# Patient Record
Sex: MALE | Race: WHITE | NOT HISPANIC OR LATINO | Employment: OTHER | URBAN - METROPOLITAN AREA
[De-identification: names, ages, dates, MRNs, and addresses within clinical notes are randomized per-mention and may not be internally consistent; named-entity substitution may affect disease eponyms.]

---

## 2017-07-18 ENCOUNTER — HISTORICAL (OUTPATIENT)
Dept: ADMINISTRATIVE | Facility: HOSPITAL | Age: 72
End: 2017-07-18

## 2018-04-09 ENCOUNTER — HISTORICAL (OUTPATIENT)
Dept: LAB | Facility: HOSPITAL | Age: 73
End: 2018-04-09

## 2018-04-09 LAB
CHOLEST SERPL-MCNC: 242 MG/DL (ref 0–200)
CHOLEST/HDLC SERPL: 4.6 {RATIO} (ref 0–5)
GLUCOSE P FAST SERPL-MCNC: 90 MG/DL (ref 70–115)
HDLC SERPL-MCNC: 53 MG/DL (ref 35–60)
LDLC SERPL CALC-MCNC: 138 MG/DL (ref 0–129)
PSA SERPL-MCNC: 0.64 NG/ML (ref 0–4)
TRIGL SERPL-MCNC: 255 MG/DL (ref 30–150)
VLDLC SERPL CALC-MCNC: 51 MG/DL

## 2019-04-10 ENCOUNTER — HISTORICAL (OUTPATIENT)
Dept: LAB | Facility: HOSPITAL | Age: 74
End: 2019-04-10

## 2019-04-10 LAB
ABS NEUT (OLG): 5.86 X10(3)/MCL (ref 2.1–9.2)
ALBUMIN SERPL-MCNC: 4.1 GM/DL (ref 3.4–5)
ALBUMIN/GLOB SERPL: 1.3 RATIO (ref 1.1–2)
ALP SERPL-CCNC: 54 UNIT/L (ref 50–136)
ALT SERPL-CCNC: 33 UNIT/L (ref 12–78)
APPEARANCE, UA: ABNORMAL
AST SERPL-CCNC: 25 UNIT/L (ref 15–37)
BACTERIA SPEC CULT: ABNORMAL /HPF
BASOPHILS # BLD AUTO: 0.1 X10(3)/MCL (ref 0–0.2)
BASOPHILS NFR BLD AUTO: 1 %
BILIRUB SERPL-MCNC: 0.8 MG/DL (ref 0.2–1)
BILIRUB UR QL STRIP: NEGATIVE
BILIRUBIN DIRECT+TOT PNL SERPL-MCNC: 0.2 MG/DL (ref 0–0.5)
BILIRUBIN DIRECT+TOT PNL SERPL-MCNC: 0.6 MG/DL (ref 0–0.8)
BUN SERPL-MCNC: 16 MG/DL (ref 7–18)
CALCIUM SERPL-MCNC: 9.3 MG/DL (ref 8.5–10.1)
CHLORIDE SERPL-SCNC: 110 MMOL/L (ref 98–107)
CHOLEST SERPL-MCNC: 204 MG/DL (ref 0–200)
CHOLEST/HDLC SERPL: 3.3 {RATIO} (ref 0–5)
CO2 SERPL-SCNC: 25 MMOL/L (ref 21–32)
COLOR UR: YELLOW
CREAT SERPL-MCNC: 1.12 MG/DL (ref 0.7–1.3)
EOSINOPHIL # BLD AUTO: 0.2 X10(3)/MCL (ref 0–0.9)
EOSINOPHIL NFR BLD AUTO: 2 %
ERYTHROCYTE [DISTWIDTH] IN BLOOD BY AUTOMATED COUNT: 13.4 % (ref 11.5–17)
EST. AVERAGE GLUCOSE BLD GHB EST-MCNC: 105 MG/DL
GLOBULIN SER-MCNC: 3.2 GM/DL (ref 2.4–3.5)
GLUCOSE (UA): NEGATIVE
GLUCOSE SERPL-MCNC: 91 MG/DL (ref 74–106)
HBA1C MFR BLD: 5.3 % (ref 4.2–6.3)
HCT VFR BLD AUTO: 49.4 % (ref 42–52)
HDLC SERPL-MCNC: 62 MG/DL (ref 35–60)
HGB BLD-MCNC: 16.8 GM/DL (ref 14–18)
HGB UR QL STRIP: NEGATIVE
KETONES UR QL STRIP: ABNORMAL
LDLC SERPL CALC-MCNC: 122 MG/DL (ref 0–129)
LEUKOCYTE ESTERASE UR QL STRIP: ABNORMAL
LYMPHOCYTES # BLD AUTO: 2 X10(3)/MCL (ref 0.6–4.6)
LYMPHOCYTES NFR BLD AUTO: 23 %
MCH RBC QN AUTO: 32.1 PG (ref 27–31)
MCHC RBC AUTO-ENTMCNC: 34 GM/DL (ref 33–36)
MCV RBC AUTO: 94.5 FL (ref 80–94)
MONOCYTES # BLD AUTO: 0.7 X10(3)/MCL (ref 0.1–1.3)
MONOCYTES NFR BLD AUTO: 8 %
NEUTROPHILS # BLD AUTO: 5.86 X10(3)/MCL (ref 2.1–9.2)
NEUTROPHILS NFR BLD AUTO: 67 %
NITRITE UR QL STRIP: NEGATIVE
PH UR STRIP: 5 [PH] (ref 5–9)
PLATELET # BLD AUTO: 219 X10(3)/MCL (ref 130–400)
PMV BLD AUTO: 11.7 FL (ref 9.4–12.4)
POTASSIUM SERPL-SCNC: 4 MMOL/L (ref 3.5–5.1)
PROT SERPL-MCNC: 7.3 GM/DL (ref 6.4–8.2)
PROT UR QL STRIP: NEGATIVE
PSA SERPL-MCNC: 0.52 NG/ML (ref 0–4)
RBC # BLD AUTO: 5.23 X10(6)/MCL (ref 4.7–6.1)
RBC #/AREA URNS HPF: ABNORMAL /[HPF]
SODIUM SERPL-SCNC: 142 MMOL/L (ref 136–145)
SP GR UR STRIP: 1.02 (ref 1–1.03)
SQUAMOUS EPITHELIAL, UA: ABNORMAL
TRIGL SERPL-MCNC: 100 MG/DL (ref 30–150)
UROBILINOGEN UR STRIP-ACNC: 0.2
VLDLC SERPL CALC-MCNC: 20 MG/DL
WBC # SPEC AUTO: 8.8 X10(3)/MCL (ref 4.5–11.5)
WBC #/AREA URNS HPF: 6 /HPF (ref 0–3)

## 2021-12-01 LAB
BUN SERPL-MCNC: 16 MG/DL (ref 7–18)
CREAT SERPL-MCNC: 1.03 MG/DL (ref 0.6–1.3)
GLUCOSE SERPL-MCNC: 151 MG/DL (ref 74–106)
POTASSIUM SERPL-SCNC: 4.2 MMOL/L (ref 3.5–5.1)
SODIUM SERPL-SCNC: 137 MEQ/L (ref 131–145)

## 2022-04-10 ENCOUNTER — HISTORICAL (OUTPATIENT)
Dept: ADMINISTRATIVE | Facility: HOSPITAL | Age: 77
End: 2022-04-10

## 2022-04-25 VITALS
HEIGHT: 67 IN | SYSTOLIC BLOOD PRESSURE: 132 MMHG | WEIGHT: 190.94 LBS | DIASTOLIC BLOOD PRESSURE: 78 MMHG | BODY MASS INDEX: 29.97 KG/M2 | OXYGEN SATURATION: 97 %

## 2022-09-19 ENCOUNTER — HISTORICAL (OUTPATIENT)
Dept: ADMINISTRATIVE | Facility: HOSPITAL | Age: 77
End: 2022-09-19

## 2023-01-20 ENCOUNTER — TELEPHONE (OUTPATIENT)
Dept: PRIMARY CARE CLINIC | Facility: CLINIC | Age: 78
End: 2023-01-20
Payer: COMMERCIAL

## 2023-01-20 NOTE — TELEPHONE ENCOUNTER
----- Message from Gladis Mckeon sent at 1/20/2023  8:28 AM CST -----  Regarding: med adv  Type:  Needs Medical Advice    Who Called: Chantel, patient's wife  Symptoms (please be specific): BP elevated  140/66  How long has patient had these symptoms:  past weeks  Pharmacy name and phone #:  [facility]  Would the patient rather a call back or a response via [facility]?   Best Call Back Number: [phone]  Additional Information: patient's BP has been elevated. Please call patient's wife back.

## 2023-01-22 PROBLEM — E78.2 MIXED HYPERLIPIDEMIA: Status: RESOLVED | Noted: 2023-01-22 | Resolved: 2023-01-22

## 2023-01-22 PROBLEM — E55.9 VITAMIN D DEFICIENCY: Status: ACTIVE | Noted: 2023-01-22

## 2023-01-22 PROBLEM — F17.210 NICOTINE DEPENDENCE, CIGARETTES, UNCOMPLICATED: Chronic | Status: ACTIVE | Noted: 2023-01-22

## 2023-01-22 PROBLEM — F17.210 NICOTINE DEPENDENCE, CIGARETTES, UNCOMPLICATED: Status: ACTIVE | Noted: 2023-01-22

## 2023-01-22 PROBLEM — Z90.49 S/P COLON RESECTION: Status: ACTIVE | Noted: 2023-01-22

## 2023-01-22 PROBLEM — E55.9 VITAMIN D DEFICIENCY: Chronic | Status: ACTIVE | Noted: 2023-01-22

## 2023-01-22 PROBLEM — E66.9 OBESITY: Status: ACTIVE | Noted: 2023-01-22

## 2023-01-22 PROBLEM — Z72.0 TOBACCO USER: Status: ACTIVE | Noted: 2023-01-22

## 2023-01-22 PROBLEM — E78.2 MIXED HYPERLIPIDEMIA: Status: ACTIVE | Noted: 2023-01-22

## 2023-01-22 PROBLEM — Z93.2 ILEOSTOMY PRESENT: Status: ACTIVE | Noted: 2023-01-22

## 2023-01-22 PROBLEM — E66.9 OBESITY: Chronic | Status: ACTIVE | Noted: 2023-01-22

## 2023-01-22 PROBLEM — R53.83 FATIGUE: Status: ACTIVE | Noted: 2023-01-22

## 2023-01-22 PROBLEM — Z72.0 TOBACCO USER: Status: RESOLVED | Noted: 2023-01-22 | Resolved: 2023-01-22

## 2023-01-22 PROBLEM — Z90.49 S/P COLON RESECTION: Chronic | Status: ACTIVE | Noted: 2023-01-22

## 2023-01-22 PROBLEM — Z93.2 ILEOSTOMY PRESENT: Chronic | Status: ACTIVE | Noted: 2023-01-22

## 2023-01-22 PROBLEM — E78.5 DYSLIPIDEMIA: Chronic | Status: ACTIVE | Noted: 2023-01-22

## 2023-01-22 NOTE — PROGRESS NOTES
"Subjective:       Patient ID: Javier De Santiago is a 77 y.o. male.    Chief Complaint: Hypertension    Established patient, last visit 11/22/2021.  Presents to the clinic to discuss elevation in blood pressure, no prior history of HTN.  Blood pressure was elevated at the dentist office, he started checking it at home, blood pressures look good, systolics occasionally slightly above 140, diastolics never above 80.  No other symptoms or problems other than his sinuses.  Does suffer with chronic and recurrent sinus problems, currently having maxillary pressure, thick mucus, fatigue.      Review of Systems   Constitutional: Negative.  Negative for fatigue and fever.   HENT:  Negative for sore throat and trouble swallowing.         Maxillary pressure/congestion   Eyes: Negative.  Negative for redness and visual disturbance.   Respiratory: Negative.  Negative for chest tightness and shortness of breath.    Cardiovascular: Negative.  Negative for chest pain.   Gastrointestinal: Negative.  Negative for abdominal pain, blood in stool and nausea.   Endocrine: Negative.  Negative for cold intolerance, heat intolerance and polyuria.   Genitourinary: Negative.    Musculoskeletal: Negative.  Negative for arthralgias, gait problem and joint swelling.   Integumentary:  Negative for rash. Negative.   Neurological: Negative.  Negative for dizziness, weakness and headaches.   Psychiatric/Behavioral: Negative.  Negative for agitation and dysphoric mood.        Objective:     Vitals:    01/23/23 1312   BP: 120/78   Pulse: 64   Resp: 18   SpO2: 97%   Weight: 84.4 kg (186 lb)   Height: 5' 6" (1.676 m)   Body mass index is 30.02 kg/m².     Physical Exam  Constitutional:       Appearance: Normal appearance.   Eyes:      Conjunctiva/sclera: Conjunctivae normal.   Cardiovascular:      Rate and Rhythm: Normal rate and regular rhythm.   Pulmonary:      Effort: Pulmonary effort is normal.      Breath sounds: Normal breath sounds.   Skin:     " General: Skin is warm and dry.   Neurological:      Mental Status: He is alert.   Psychiatric:         Mood and Affect: Mood normal.         Behavior: Behavior normal.         Lab Results   Component Value Date     12/01/2021    K 4.2 12/01/2021    CO2 26.0 01/08/2020    BUN 16 12/01/2021    CREATININE 1.03 12/01/2021    CALCIUM 9.6 01/08/2020    ALBUMIN 3.20 (L) 01/08/2020    BILITOT 1.1 (H) 01/08/2020    ALKPHOS 72 01/08/2020    AST 15 01/08/2020    ALT 22 01/08/2020    EGFRNONAA 43 01/08/2020     Lab Results   Component Value Date    WBC 12.5 (H) 01/08/2020    RBC 4.84 01/08/2020    HGB 15.5 01/08/2020    HCT 45.0 01/08/2020    MCV 93.0 01/08/2020    RDW 12.5 01/08/2020     01/08/2020      Lab Results   Component Value Date    CHOL 204 (H) 04/10/2019    TRIG 100 04/10/2019    HDL 62 (H) 04/10/2019     04/10/2019    TOTALCHOLEST 3.3 04/10/2019     No results found for: TSH  Lab Results   Component Value Date    HGBA1C 5.3 04/10/2019        Lab Results   Component Value Date    PSA 0.52 04/10/2019         Assessment:     Problem List Items Addressed This Visit          ENT    Acute recurrent maxillary sinusitis - Primary (Chronic)    Current Assessment & Plan     Increased fluid intake.  Start antibiotic today, take until completion.  Ibuprofen or Tylenol for sinus pain/fever as needed.  Delsym with guaifenesin or Mucinex DM as needed for cough and congestion.  Seek further medical care for any further significant problems.         Relevant Medications    betamethasone acetate-betamethasone sodium phosphate injection 12 mg (Start on 1/23/2023  1:45 PM)    cefdinir (OMNICEF) 300 MG capsule       Cardiac/Vascular    Dyslipidemia (Chronic)    Relevant Orders    Lipid Panel     Other Visit Diagnoses       Wellness examination        This diagnosis does not apply to this visit, wellness exam with pre visit labs will be scheduled/ordered for future visit.    Relevant Orders    TSH    Lipid Panel     Comprehensive Metabolic Panel    Hemoglobin A1C    CBC Auto Differential    PSA, Screening    Hepatitis C Antibody    Screening for prostate cancer        This diagnosis does not apply to this visit, appropriate prostate cancer screening will be ordered for next visit/wellness exam.    Relevant Orders    PSA, Screening                 Plan:             Follow up in about 14 weeks (around 5/1/2023) for Medicare Wellness.

## 2023-01-23 ENCOUNTER — OFFICE VISIT (OUTPATIENT)
Dept: PRIMARY CARE CLINIC | Facility: CLINIC | Age: 78
End: 2023-01-23
Payer: COMMERCIAL

## 2023-01-23 VITALS
WEIGHT: 186 LBS | RESPIRATION RATE: 18 BRPM | HEART RATE: 64 BPM | SYSTOLIC BLOOD PRESSURE: 120 MMHG | DIASTOLIC BLOOD PRESSURE: 78 MMHG | HEIGHT: 66 IN | BODY MASS INDEX: 29.89 KG/M2 | OXYGEN SATURATION: 97 %

## 2023-01-23 DIAGNOSIS — J01.01 ACUTE RECURRENT MAXILLARY SINUSITIS: Primary | Chronic | ICD-10-CM

## 2023-01-23 DIAGNOSIS — Z00.00 WELLNESS EXAMINATION: ICD-10-CM

## 2023-01-23 DIAGNOSIS — Z12.5 SCREENING FOR PROSTATE CANCER: ICD-10-CM

## 2023-01-23 DIAGNOSIS — E78.5 DYSLIPIDEMIA: Chronic | ICD-10-CM

## 2023-01-23 PROCEDURE — 3078F DIAST BP <80 MM HG: CPT | Mod: CPTII,,, | Performed by: GENERAL PRACTICE

## 2023-01-23 PROCEDURE — 3074F SYST BP LT 130 MM HG: CPT | Mod: CPTII,,, | Performed by: GENERAL PRACTICE

## 2023-01-23 PROCEDURE — 1159F MED LIST DOCD IN RCRD: CPT | Mod: CPTII,,, | Performed by: GENERAL PRACTICE

## 2023-01-23 PROCEDURE — 99213 PR OFFICE/OUTPT VISIT, EST, LEVL III, 20-29 MIN: ICD-10-PCS | Mod: 25,,, | Performed by: GENERAL PRACTICE

## 2023-01-23 PROCEDURE — 1159F PR MEDICATION LIST DOCUMENTED IN MEDICAL RECORD: ICD-10-PCS | Mod: CPTII,,, | Performed by: GENERAL PRACTICE

## 2023-01-23 PROCEDURE — 3074F PR MOST RECENT SYSTOLIC BLOOD PRESSURE < 130 MM HG: ICD-10-PCS | Mod: CPTII,,, | Performed by: GENERAL PRACTICE

## 2023-01-23 PROCEDURE — 3078F PR MOST RECENT DIASTOLIC BLOOD PRESSURE < 80 MM HG: ICD-10-PCS | Mod: CPTII,,, | Performed by: GENERAL PRACTICE

## 2023-01-23 PROCEDURE — 99213 OFFICE O/P EST LOW 20 MIN: CPT | Mod: 25,,, | Performed by: GENERAL PRACTICE

## 2023-01-23 PROCEDURE — 96372 PR INJECTION,THERAP/PROPH/DIAG2ST, IM OR SUBCUT: ICD-10-PCS | Mod: ,,, | Performed by: GENERAL PRACTICE

## 2023-01-23 PROCEDURE — 96372 THER/PROPH/DIAG INJ SC/IM: CPT | Mod: ,,, | Performed by: GENERAL PRACTICE

## 2023-01-23 RX ORDER — FLUTICASONE PROPIONATE 50 MCG
2 SPRAY, SUSPENSION (ML) NASAL DAILY
COMMUNITY

## 2023-01-23 RX ORDER — POTASSIUM CITRATE 10 MEQ/1
10 TABLET, EXTENDED RELEASE ORAL 3 TIMES DAILY
COMMUNITY
Start: 2022-12-27

## 2023-01-23 RX ORDER — TAMSULOSIN HYDROCHLORIDE 0.4 MG/1
0.4 CAPSULE ORAL
COMMUNITY
End: 2023-05-03

## 2023-01-23 RX ORDER — MINERAL OIL
180 ENEMA (ML) RECTAL DAILY
COMMUNITY

## 2023-01-23 RX ORDER — CEFDINIR 300 MG/1
300 CAPSULE ORAL 2 TIMES DAILY
Qty: 20 CAPSULE | Refills: 0 | Status: SHIPPED | OUTPATIENT
Start: 2023-01-23 | End: 2023-02-02

## 2023-01-23 RX ORDER — BETAMETHASONE SODIUM PHOSPHATE AND BETAMETHASONE ACETATE 3; 3 MG/ML; MG/ML
12 INJECTION, SUSPENSION INTRA-ARTICULAR; INTRALESIONAL; INTRAMUSCULAR; SOFT TISSUE
Status: COMPLETED | OUTPATIENT
Start: 2023-01-23 | End: 2023-01-23

## 2023-01-23 RX ADMIN — BETAMETHASONE SODIUM PHOSPHATE AND BETAMETHASONE ACETATE 12 MG: 3; 3 INJECTION, SUSPENSION INTRA-ARTICULAR; INTRALESIONAL; INTRAMUSCULAR; SOFT TISSUE at 01:01

## 2023-04-12 ENCOUNTER — TELEPHONE (OUTPATIENT)
Dept: PRIMARY CARE CLINIC | Facility: CLINIC | Age: 78
End: 2023-04-12
Payer: COMMERCIAL

## 2023-04-12 NOTE — TELEPHONE ENCOUNTER
Spoke with Patient's wife regarding Wellness visit and Nurse visit. She wanted to make sure if we were in network with pt's insurance and if we weren't could he draw blood elsewhere. She will contact me if I need to print lab orders for them to take to an in network facility

## 2023-04-12 NOTE — TELEPHONE ENCOUNTER
----- Message from Emilie Hansen sent at 4/12/2023 11:44 AM CDT -----  Regarding: appt - charge  .Type:  Needs Medical Advice    Who Called: pt - wife   Symptoms (please be specific):    How long has patient had these symptoms:    Pharmacy name and phone #:    Would the patient rather a call back or a response via [facility]?   Best Call Back Number: [phone]  Additional Information: has questions about up coming appt . call to advise

## 2023-04-12 NOTE — TELEPHONE ENCOUNTER
----- Message from Emilie Hansen sent at 4/12/2023 11:44 AM CDT -----  Regarding: appt - charge  .Type:  Needs Medical Advice    Who Called: pt - wife   Symptoms (please be specific):    How long has patient had these symptoms:    Pharmacy name and phone #:    Would the patient rather a call back or a response via [facility]?   Best Call Back Number: [phone]  Additional Information: has questions about up coming appt . call to advise

## 2023-04-27 ENCOUNTER — CLINICAL SUPPORT (OUTPATIENT)
Dept: PRIMARY CARE CLINIC | Facility: CLINIC | Age: 78
End: 2023-04-27
Payer: COMMERCIAL

## 2023-04-27 DIAGNOSIS — E78.5 DYSLIPIDEMIA: Chronic | ICD-10-CM

## 2023-04-27 DIAGNOSIS — Z12.5 SCREENING FOR PROSTATE CANCER: ICD-10-CM

## 2023-04-27 DIAGNOSIS — Z00.00 WELLNESS EXAMINATION: ICD-10-CM

## 2023-04-27 DIAGNOSIS — Z00.00 ENCOUNTER FOR WELLNESS EXAMINATION IN ADULT: Primary | ICD-10-CM

## 2023-04-27 LAB
ALBUMIN SERPL-MCNC: 3.7 G/DL (ref 3.4–4.8)
ALBUMIN/GLOB SERPL: 1.1 RATIO (ref 1.1–2)
ALP SERPL-CCNC: 59 UNIT/L (ref 40–150)
ALT SERPL-CCNC: 16 UNIT/L (ref 0–55)
AST SERPL-CCNC: 24 UNIT/L (ref 5–34)
BASOPHILS # BLD AUTO: 0.04 X10(3)/MCL (ref 0–0.2)
BASOPHILS NFR BLD AUTO: 0.5 %
BILIRUBIN DIRECT+TOT PNL SERPL-MCNC: 0.9 MG/DL
BUN SERPL-MCNC: 18.5 MG/DL (ref 8.4–25.7)
CALCIUM SERPL-MCNC: 9.7 MG/DL (ref 8.8–10)
CHLORIDE SERPL-SCNC: 107 MMOL/L (ref 98–107)
CHOLEST SERPL-MCNC: 210 MG/DL
CHOLEST/HDLC SERPL: 4 {RATIO} (ref 0–5)
CO2 SERPL-SCNC: 23 MMOL/L (ref 23–31)
CREAT SERPL-MCNC: 0.99 MG/DL (ref 0.73–1.18)
EOSINOPHIL # BLD AUTO: 0.29 X10(3)/MCL (ref 0–0.9)
EOSINOPHIL NFR BLD AUTO: 3.4 %
ERYTHROCYTE [DISTWIDTH] IN BLOOD BY AUTOMATED COUNT: 13.6 % (ref 11.5–17)
EST. AVERAGE GLUCOSE BLD GHB EST-MCNC: 108.3 MG/DL
GFR SERPLBLD CREATININE-BSD FMLA CKD-EPI: >60 MLS/MIN/1.73/M2
GLOBULIN SER-MCNC: 3.4 GM/DL (ref 2.4–3.5)
GLUCOSE SERPL-MCNC: 99 MG/DL (ref 82–115)
HBA1C MFR BLD: 5.4 %
HCT VFR BLD AUTO: 47.2 % (ref 42–52)
HDLC SERPL-MCNC: 53 MG/DL (ref 35–60)
HGB BLD-MCNC: 16.1 G/DL (ref 14–18)
IMM GRANULOCYTES # BLD AUTO: 0.03 X10(3)/MCL (ref 0–0.04)
IMM GRANULOCYTES NFR BLD AUTO: 0.3 %
LDLC SERPL CALC-MCNC: 137 MG/DL (ref 50–140)
LYMPHOCYTES # BLD AUTO: 3.74 X10(3)/MCL (ref 0.6–4.6)
LYMPHOCYTES NFR BLD AUTO: 43.5 %
MCH RBC QN AUTO: 33 PG (ref 27–31)
MCHC RBC AUTO-ENTMCNC: 34.1 G/DL (ref 33–36)
MCV RBC AUTO: 96.7 FL (ref 80–94)
MONOCYTES # BLD AUTO: 0.75 X10(3)/MCL (ref 0.1–1.3)
MONOCYTES NFR BLD AUTO: 8.7 %
NEUTROPHILS # BLD AUTO: 3.74 X10(3)/MCL (ref 2.1–9.2)
NEUTROPHILS NFR BLD AUTO: 43.6 %
NRBC BLD AUTO-RTO: 0 %
PLATELET # BLD AUTO: 205 X10(3)/MCL (ref 130–400)
PMV BLD AUTO: 11.3 FL (ref 7.4–10.4)
POTASSIUM SERPL-SCNC: 4.4 MMOL/L (ref 3.5–5.1)
PROT SERPL-MCNC: 7.1 GM/DL (ref 5.8–7.6)
PSA SERPL-MCNC: 0.79 NG/ML
RBC # BLD AUTO: 4.88 X10(6)/MCL (ref 4.7–6.1)
SODIUM SERPL-SCNC: 138 MMOL/L (ref 136–145)
TRIGL SERPL-MCNC: 101 MG/DL (ref 34–140)
TSH SERPL-ACNC: 2.64 UIU/ML (ref 0.35–4.94)
VLDLC SERPL CALC-MCNC: 20 MG/DL
WBC # SPEC AUTO: 8.6 X10(3)/MCL (ref 4.5–11.5)

## 2023-04-27 PROCEDURE — 84443 ASSAY THYROID STIM HORMONE: CPT | Performed by: GENERAL PRACTICE

## 2023-04-27 PROCEDURE — 85025 COMPLETE CBC W/AUTO DIFF WBC: CPT | Performed by: GENERAL PRACTICE

## 2023-04-27 PROCEDURE — 80053 COMPREHEN METABOLIC PANEL: CPT | Performed by: GENERAL PRACTICE

## 2023-04-27 PROCEDURE — 36415 PR COLLECTION VENOUS BLOOD,VENIPUNCTURE: ICD-10-PCS | Mod: ,,, | Performed by: GENERAL PRACTICE

## 2023-04-27 PROCEDURE — 83036 HEMOGLOBIN GLYCOSYLATED A1C: CPT | Performed by: GENERAL PRACTICE

## 2023-04-27 PROCEDURE — 86803 HEPATITIS C AB TEST: CPT | Performed by: GENERAL PRACTICE

## 2023-04-27 PROCEDURE — 36415 COLL VENOUS BLD VENIPUNCTURE: CPT | Mod: ,,, | Performed by: GENERAL PRACTICE

## 2023-04-27 PROCEDURE — 36415 COLL VENOUS BLD VENIPUNCTURE: CPT

## 2023-04-27 PROCEDURE — 84153 ASSAY OF PSA TOTAL: CPT | Performed by: GENERAL PRACTICE

## 2023-04-27 PROCEDURE — 80061 LIPID PANEL: CPT | Performed by: GENERAL PRACTICE

## 2023-04-28 LAB — HCV AB SERPL QL IA: NONREACTIVE

## 2023-05-02 NOTE — PROGRESS NOTES
Patient ID: [id number]     Chief Complaint: Annual Exam and Sinus Problem      HPI:     Javier DeS antiago is a 77 y.o. male here today for a Medicare Wellness.     A significant and separate E/M service was performed.  Patient has a multitude of complaints today, of which the most pressing is a recurrence of his sinus issues.  I saw him several months ago for a recurrent sinus infection.  I gave him a Celestone injection, and Omnicef 300 mg twice a day for 10 days.  He states that that treatment plan helped immensely, in fact after the 10 days he was much better than he had been in a while.  However, has had a slow recurrence of his symptoms, now with sinus pressure, thickening slightly purulent mucus, and some postnasal drainage.  No fever.  Has never had an evaluation of his sinus function or allergy status.  Also, has some issues with ongoing fatigue, some issues with stability during walking.  Nothing that would cause any significant amount of dysfunction, nothing that he would wish to have worked up medically.      ----------------------------  Fatigue  H/O colectomy  H/O ileostomy  Hyperlipidemia     Past Surgical History:   Procedure Laterality Date    COLECTOMY      CYSTO, RETROGRADE PYELOGRAM,BALLOON DILATION, STENT PLACEMENT  01/24/2020    ILEOSTOMY         Review of patient's allergies indicates:  No Known Allergies    Outpatient Medications Marked as Taking for the 5/3/23 encounter (Office Visit) with Mick Lorenzana MD   Medication Sig Dispense Refill    fluticasone propionate (FLONASE) 50 mcg/actuation nasal spray 2 sprays by Each Nostril route once daily.      potassium citrate (UROCIT-K) 10 mEq (1,080 mg) TbSR Take 10 mEq by mouth 3 (three) times daily.       Current Facility-Administered Medications for the 5/3/23 encounter (Office Visit) with Mick Lorenzana MD   Medication Dose Route Frequency Provider Last Rate Last Admin    [COMPLETED] triamcinolone acetonide injection 40 mg  40 mg Intramuscular 1  time in Clinic/HOD Mick Lorenzana MD   40 mg at 05/03/23 1117    [DISCONTINUED] triamcinolone acetonide injection 40 mg  40 mg Intra-articular 1 time in Clinic/HOD Mick Lorenzana MD           Social History     Socioeconomic History    Marital status:    Tobacco Use    Smoking status: Every Day     Types: Pipe    Smokeless tobacco: Never    Tobacco comments:     A pipe a day keeps the ...        Family History   Problem Relation Age of Onset    Dementia Mother     Heart failure Father         Patient Care Team:  Mick Lorenzana MD as PCP - General (Family Medicine)  Jatin Regan MD as Consulting Physician (Urology)     Opioid Screening: Patient medication list reviewed, patient is not taking prescription opioids. Patient is not using additional opioids than prescribed. Patient is at low risk of substance abuse based on this opioid use history.       Subjective:     Review of Systems   Constitutional:  Positive for malaise/fatigue. Negative for weight loss.   HENT:  Positive for congestion and sinus pain.    Eyes: Negative.    Respiratory: Negative.  Negative for shortness of breath.    Cardiovascular: Negative.  Negative for chest pain.   Gastrointestinal: Negative.    Genitourinary: Negative.    Musculoskeletal: Negative.    Skin: Negative.    Neurological: Negative.  Negative for focal weakness, weakness and headaches.   Endo/Heme/Allergies: Negative.    Psychiatric/Behavioral: Negative.  Negative for depression and memory loss. The patient is not nervous/anxious.        Patient Reported Health Risk Assessment  What [age] 70-79  Are you male or female?: Male  During the past four weeks, how much have you been bothered by emotional problems such as feeling anxious, depressed, irritable, sad, or downhearted and blue?: Not at all  During the past five weeks, has your physical and/or emotional health limited your social activities with family, friends, neighbors, or groups?: Not at all  During the  past four weeks, how much bodily pain have you generally had?: No pain  During the past four weeks, was someone available to help if you needed and wanted help?: Yes, as much as I wanted  During the past four weeks, what was the hardest physical activity you could do for at least two minutes?: Light  Can you get to places out of walking distance without help?  (For example, can you travel alone on buses or taxis, or drive your own car?): Yes  Can you go shopping for groceries or clothes without someone's help?: Yes  Can you prepare your own meals?: Yes  Can you do your own housework without help?: Yes  Because of any health problems, do you need the help of another person with your personal care needs such as eating, bathing, dressing, or getting around the house?: No  Can you handle your own money without help?: Yes  During the past four weeks, how would you rate your health in general?: Good  How have things been going for you during the past four weeks?: Pretty well  Are you having difficulties driving your car?: No  Do you always fasten your seat belt when you are in a car?: Yes, usually  How often in the past four weeks have you been bothered by falling or dizzy when standing up?: Never  How often in the past four weeks have you been bothered by sexual problems?: Never  How often in the past four weeks have you been bothered by trouble eating well?: Never  How often in the past four weeks have you been bothered by teeth or denture problems?: Never  How often in the past four weeks have you been bothered with problems using the telephone?: Never  How often in the past four weeks have you been bothered by tiredness or fatigue?: Never  Have you fallen two or more times in the past year?: No  Are you afraid of falling?: No  Are you a smoker?: Yes, I'm not ready to quit  During the past four weeks, how many drinks of wine, beer, or other alcoholic beverages did you have?: No alcohol at all  Do you exercise for about  "20 minutes three or more days a week?: No, I usually do not exercise this much  Have you been given any information to help you with hazards in your house that might hurt you?: No  Have you been given any information to help you with keeping track of your medications?: No  How often do you have trouble taking medicines the way you've been told to take them?: I always take them as prescribed  How confident are you that you can control and manage most of your health problems?: Very confident  What is your race? (Check all that apply.):     Objective:     /80   Pulse 60   Resp 18   Ht 5' 6" (1.676 m)   Wt 85.7 kg (189 lb)   SpO2 100%   BMI 30.51 kg/m²     Physical Exam  Constitutional:       Appearance: Normal appearance.   HENT:      Head: Normocephalic.      Comments: Sinus pressure, congestion     Mouth/Throat:      Pharynx: Oropharynx is clear.   Eyes:      Conjunctiva/sclera: Conjunctivae normal.      Pupils: Pupils are equal, round, and reactive to light.   Cardiovascular:      Rate and Rhythm: Normal rate and regular rhythm.      Heart sounds: Normal heart sounds.   Pulmonary:      Effort: Pulmonary effort is normal.      Breath sounds: Normal breath sounds.   Abdominal:      General: Abdomen is flat.      Palpations: Abdomen is soft.   Musculoskeletal:         General: Normal range of motion.      Cervical back: Neck supple.   Skin:     General: Skin is warm and dry.   Neurological:      General: No focal deficit present.      Mental Status: He is oriented to person, place, and time.   Psychiatric:         Mood and Affect: Mood normal.         Behavior: Behavior normal.         Thought Content: Thought content normal.         Judgment: Judgment normal.       Lab Results   Component Value Date     04/27/2023    K 4.4 04/27/2023    CO2 23 04/27/2023    BUN 18.5 04/27/2023    CREATININE 0.99 04/27/2023    CALCIUM 9.7 04/27/2023    ALBUMIN 3.7 04/27/2023    BILITOT 0.9 04/27/2023    " ALKPHOS 59 04/27/2023    AST 24 04/27/2023    ALT 16 04/27/2023    EGFRNORACEVR >60 04/27/2023     Lab Results   Component Value Date    WBC 8.6 04/27/2023    RBC 4.88 04/27/2023    HGB 16.1 04/27/2023    HCT 47.2 04/27/2023    MCV 96.7 (H) 04/27/2023    RDW 13.6 04/27/2023     04/27/2023      Lab Results   Component Value Date    CHOL 210 (H) 04/27/2023    TRIG 101 04/27/2023    HDL 53 04/27/2023    .00 04/27/2023    TOTALCHOLEST 4 04/27/2023     Lab Results   Component Value Date    TSH 2.639 04/27/2023     Lab Results   Component Value Date    HGBA1C 5.4 04/27/2023        Lab Results   Component Value Date    PSA 0.79 04/27/2023         No flowsheet data found.  Fall Risk Assessment - Outpatient 5/3/2023   Mobility Status Ambulatory   Number of falls 0   Identified as fall risk 0           Depression Screening  Over the past two weeks, has the patient felt down, depressed, or hopeless?: No  Over the past two weeks, has the patient felt little interest or pleasure in doing things?: No  Functional Ability/Safety Screening  Was the patient's timed Up & Go test unsteady or longer than 30 seconds?: No  Does the patient need help with phone, transportation, shopping, preparing meals, housework, laundry, meds, or managing money?: No  Does the patient's home have rugs in the hallway, lack grab bars in the bathroom, lack handrails on the stairs or have poor lighting?: No  Have you noticed any hearing difficulties?: No  Cognitive Function (Assessed through direct observation with due consideration of information obtained by way of patient reports and/or concerns raised by family, friends, caretakers, or others)    Does the patient repeat questions/statements in the same day?: No  Does the patient have trouble remembering the date, year, and time?: No  Does the patient have difficulty managing finances?: No  Does the patient have a decreased sense of direction?: No  Assessment:       ICD-10-CM ICD-9-CM   1.  Medicare annual wellness visit, subsequent  Z00.00 V70.0   2. Screening for prostate cancer  Z12.5 V76.44   3. Dyslipidemia  E78.5 272.4   4. Class 1 obesity due to excess calories with serious comorbidity and body mass index (BMI) of 30.0 to 30.9 in adult  E66.09 278.00    Z68.30 V85.30   5. Nicotine dependence, cigarettes, uncomplicated  F17.210 305.1   6. Acute recurrent maxillary sinusitis  J01.01 461.0        Plan:     Medicare Annual Wellness and Personalized Prevention Plan:   Fall Risk + Home Safety + Hearing Impairment + Depression Screen + Cognitive Impairment Screen + Health Risk Assessment all reviewed.       Health Maintenance Topics with due status: Not Due       Topic Last Completion Date    Lipid Panel 04/27/2023      The patient's Health Maintenance was reviewed and the following appears to be due at this time:   Health Maintenance Due   Topic Date Due    Abdominal Aortic Aneurysm Screening  Never done         1. Medicare annual wellness visit, subsequent  Comments:  Overall health status was reviewed.  Good health habits reinforced.  Annual wellness exams recommended.    2. Screening for prostate cancer  Comments:  Prostate specific antigen blood test obtained was essentially normal, suggesting that there is no current concern for prostate cancer.  Digital exam deferred.    3. Dyslipidemia  Assessment & Plan:  Most recent cholesterol/lipid blood testing shows adequate control, continue current treatment plan, including prescription medications if prescribed, and/or diet high in fiber, low in saturated fats as discussed during clinic visit.      4. Class 1 obesity due to excess calories with serious comorbidity and body mass index (BMI) of 30.0 to 30.9 in adult  Assessment & Plan:  Weight loss, healthy dietary choices, increased activity/exercise are recommended.  To lose weight, it is generally recommended to restrict calories to approximately 1500 calories per day to lose 1 lb per week, and  "approximately 1200 calories per day to lose up to 2 lb per week.  Generally, this is a healthy amount of weight to lose, and an appropriate amount of time to lose this amount of weight.  Adding exercise will assist in losing weight, particularly aerobic exercise such as walking.  However, resistance training, or lifting weights" over time may assistant weight loss by increasing basal metabolic rate, or the rate at which your body burns calories during periods of inactivity.    Keep track of BMI (body mass index), below 25 is considered normal, over 25 but below 30 is considered overweight, anything over 30 is considered moderately obese, over 35 severely obese, and over 40 is characterized as morbidly obese.      5. Nicotine dependence, cigarettes, uncomplicated  Assessment & Plan:  Patient smokes a pipe, does not inhale.  Cessation of tobacco use in any form was discussed and strongly encouraged.  Assistance offered, information will be provided.  If not ready to quit now, patient will contact this clinic in the future if I can be of any assistance related to the discontinuation of tobacco use/smoking.      6. Acute recurrent maxillary sinusitis  Assessment & Plan:  Increased fluid intake.  Start antibiotic today, take until completion.  Ibuprofen or Tylenol for sinus pain/fever as needed.  Delsym with guaifenesin or Mucinex DM as needed for cough and congestion.  Seek further medical care for any further significant problems.    Orders:  -     Discontinue: cefdinir (OMNICEF) 300 MG capsule; Take 1 capsule (300 mg total) by mouth 2 (two) times daily. for 14 days  Dispense: 28 capsule; Refill: 0  -     Discontinue: triamcinolone acetonide injection 40 mg  -     triamcinolone acetonide injection 40 mg  -     cefdinir (OMNICEF) 300 MG capsule; Take 1 capsule (300 mg total) by mouth 2 (two) times daily. for 14 days  Dispense: 28 capsule; Refill: 1                  Medication List with Changes/Refills   New Medications "    CEFDINIR (OMNICEF) 300 MG CAPSULE    Take 1 capsule (300 mg total) by mouth 2 (two) times daily. for 14 days       Start Date: 5/3/2023  End Date: 5/17/2023   Current Medications    FEXOFENADINE (ALLEGRA) 180 MG TABLET    Take 180 mg by mouth once daily.       Start Date: --        End Date: --    FLUTICASONE PROPIONATE (FLONASE) 50 MCG/ACTUATION NASAL SPRAY    2 sprays by Each Nostril route once daily.       Start Date: --        End Date: --    POTASSIUM CITRATE (UROCIT-K) 10 MEQ (1,080 MG) TBSR    Take 10 mEq by mouth 3 (three) times daily.       Start Date: 12/27/2022End Date: --   Discontinued Medications    TAMSULOSIN (FLOMAX) 0.4 MG CAP    Take 0.4 mg by mouth.       Start Date: --        End Date: 5/3/2023        Follow up in about 23 weeks (around 10/11/2023) for Chronic condition F/up. In addition to their scheduled follow up, the patient has also been instructed to follow up on as needed basis.

## 2023-05-02 NOTE — ASSESSMENT & PLAN NOTE
Patient smokes a pipe, does not inhale.  Cessation of tobacco use in any form was discussed and strongly encouraged.  Assistance offered, information will be provided.  If not ready to quit now, patient will contact this clinic in the future if I can be of any assistance related to the discontinuation of tobacco use/smoking.

## 2023-05-03 ENCOUNTER — OFFICE VISIT (OUTPATIENT)
Dept: PRIMARY CARE CLINIC | Facility: CLINIC | Age: 78
End: 2023-05-03
Payer: COMMERCIAL

## 2023-05-03 VITALS
RESPIRATION RATE: 18 BRPM | DIASTOLIC BLOOD PRESSURE: 80 MMHG | OXYGEN SATURATION: 100 % | BODY MASS INDEX: 30.37 KG/M2 | HEIGHT: 66 IN | SYSTOLIC BLOOD PRESSURE: 130 MMHG | HEART RATE: 60 BPM | WEIGHT: 189 LBS

## 2023-05-03 DIAGNOSIS — Z00.00 MEDICARE ANNUAL WELLNESS VISIT, SUBSEQUENT: Primary | ICD-10-CM

## 2023-05-03 DIAGNOSIS — F17.210 NICOTINE DEPENDENCE, CIGARETTES, UNCOMPLICATED: Chronic | ICD-10-CM

## 2023-05-03 DIAGNOSIS — E66.09 CLASS 1 OBESITY DUE TO EXCESS CALORIES WITH SERIOUS COMORBIDITY AND BODY MASS INDEX (BMI) OF 30.0 TO 30.9 IN ADULT: ICD-10-CM

## 2023-05-03 DIAGNOSIS — J01.01 ACUTE RECURRENT MAXILLARY SINUSITIS: Chronic | ICD-10-CM

## 2023-05-03 DIAGNOSIS — Z12.5 SCREENING FOR PROSTATE CANCER: ICD-10-CM

## 2023-05-03 DIAGNOSIS — E78.5 DYSLIPIDEMIA: Chronic | ICD-10-CM

## 2023-05-03 PROBLEM — R53.83 FATIGUE: Chronic | Status: ACTIVE | Noted: 2023-01-22

## 2023-05-03 PROBLEM — F17.290 NICOTINE DEPENDENCE, OTHER TOBACCO PRODUCT, UNCOMPLICATED: Chronic | Status: ACTIVE | Noted: 2023-01-22

## 2023-05-03 PROBLEM — F17.290 NICOTINE DEPENDENCE, OTHER TOBACCO PRODUCT, UNCOMPLICATED: Status: ACTIVE | Noted: 2023-01-22

## 2023-05-03 PROBLEM — E66.811 CLASS 1 OBESITY DUE TO EXCESS CALORIES WITH SERIOUS COMORBIDITY AND BODY MASS INDEX (BMI) OF 30.0 TO 30.9 IN ADULT: Status: ACTIVE | Noted: 2023-01-22

## 2023-05-03 PROCEDURE — 1159F PR MEDICATION LIST DOCUMENTED IN MEDICAL RECORD: ICD-10-PCS | Mod: CPTII,,, | Performed by: GENERAL PRACTICE

## 2023-05-03 PROCEDURE — 3079F DIAST BP 80-89 MM HG: CPT | Mod: CPTII,,, | Performed by: GENERAL PRACTICE

## 2023-05-03 PROCEDURE — 3079F PR MOST RECENT DIASTOLIC BLOOD PRESSURE 80-89 MM HG: ICD-10-PCS | Mod: CPTII,,, | Performed by: GENERAL PRACTICE

## 2023-05-03 PROCEDURE — G0439 PR MEDICARE ANNUAL WELLNESS SUBSEQUENT VISIT: ICD-10-PCS | Mod: ,,, | Performed by: GENERAL PRACTICE

## 2023-05-03 PROCEDURE — 1159F MED LIST DOCD IN RCRD: CPT | Mod: CPTII,,, | Performed by: GENERAL PRACTICE

## 2023-05-03 PROCEDURE — 3075F PR MOST RECENT SYSTOLIC BLOOD PRESS GE 130-139MM HG: ICD-10-PCS | Mod: CPTII,,, | Performed by: GENERAL PRACTICE

## 2023-05-03 PROCEDURE — 3288F FALL RISK ASSESSMENT DOCD: CPT | Mod: CPTII,,, | Performed by: GENERAL PRACTICE

## 2023-05-03 PROCEDURE — 1101F PR PT FALLS ASSESS DOC 0-1 FALLS W/OUT INJ PAST YR: ICD-10-PCS | Mod: CPTII,,, | Performed by: GENERAL PRACTICE

## 2023-05-03 PROCEDURE — 96372 THER/PROPH/DIAG INJ SC/IM: CPT | Mod: ,,, | Performed by: GENERAL PRACTICE

## 2023-05-03 PROCEDURE — 96372 PR INJECTION,THERAP/PROPH/DIAG2ST, IM OR SUBCUT: ICD-10-PCS | Mod: ,,, | Performed by: GENERAL PRACTICE

## 2023-05-03 PROCEDURE — 1160F PR REVIEW ALL MEDS BY PRESCRIBER/CLIN PHARMACIST DOCUMENTED: ICD-10-PCS | Mod: CPTII,,, | Performed by: GENERAL PRACTICE

## 2023-05-03 PROCEDURE — 99213 OFFICE O/P EST LOW 20 MIN: CPT | Mod: 25,,, | Performed by: GENERAL PRACTICE

## 2023-05-03 PROCEDURE — 3288F PR FALLS RISK ASSESSMENT DOCUMENTED: ICD-10-PCS | Mod: CPTII,,, | Performed by: GENERAL PRACTICE

## 2023-05-03 PROCEDURE — 99213 PR OFFICE/OUTPT VISIT, EST, LEVL III, 20-29 MIN: ICD-10-PCS | Mod: 25,,, | Performed by: GENERAL PRACTICE

## 2023-05-03 PROCEDURE — G0439 PPPS, SUBSEQ VISIT: HCPCS | Mod: ,,, | Performed by: GENERAL PRACTICE

## 2023-05-03 PROCEDURE — 1101F PT FALLS ASSESS-DOCD LE1/YR: CPT | Mod: CPTII,,, | Performed by: GENERAL PRACTICE

## 2023-05-03 PROCEDURE — 1160F RVW MEDS BY RX/DR IN RCRD: CPT | Mod: CPTII,,, | Performed by: GENERAL PRACTICE

## 2023-05-03 PROCEDURE — 3075F SYST BP GE 130 - 139MM HG: CPT | Mod: CPTII,,, | Performed by: GENERAL PRACTICE

## 2023-05-03 RX ORDER — TRIAMCINOLONE ACETONIDE 40 MG/ML
40 INJECTION, SUSPENSION INTRA-ARTICULAR; INTRAMUSCULAR
Status: DISCONTINUED | OUTPATIENT
Start: 2023-05-03 | End: 2023-05-03

## 2023-05-03 RX ORDER — CEFDINIR 300 MG/1
300 CAPSULE ORAL 2 TIMES DAILY
Qty: 28 CAPSULE | Refills: 0 | Status: SHIPPED | OUTPATIENT
Start: 2023-05-03 | End: 2023-05-03

## 2023-05-03 RX ORDER — CEFDINIR 300 MG/1
300 CAPSULE ORAL 2 TIMES DAILY
Qty: 28 CAPSULE | Refills: 1 | Status: SHIPPED | OUTPATIENT
Start: 2023-05-03 | End: 2023-05-17

## 2023-05-03 RX ORDER — TRIAMCINOLONE ACETONIDE 40 MG/ML
40 INJECTION, SUSPENSION INTRA-ARTICULAR; INTRAMUSCULAR
Status: COMPLETED | OUTPATIENT
Start: 2023-05-03 | End: 2023-05-03

## 2023-05-03 RX ADMIN — TRIAMCINOLONE ACETONIDE 40 MG: 40 INJECTION, SUSPENSION INTRA-ARTICULAR; INTRAMUSCULAR at 11:05

## 2023-09-06 RX ORDER — CEFDINIR 300 MG/1
300 CAPSULE ORAL 2 TIMES DAILY
COMMUNITY
Start: 2023-05-03 | End: 2023-09-06 | Stop reason: SDUPTHER

## 2023-09-06 RX ORDER — CEFDINIR 300 MG/1
300 CAPSULE ORAL 2 TIMES DAILY
Qty: 28 CAPSULE | Refills: 1 | Status: SHIPPED | OUTPATIENT
Start: 2023-09-06 | End: 2023-10-11

## 2023-10-10 NOTE — PROGRESS NOTES
"Subjective:       Patient ID: Javier De Santiago is a 78 y.o. male.    Chief Complaint: Follow-up and Hyperlipidemia    Patient presents to the clinic today for chronic condition follow-up.   Is still having some fatigue, he was seen about five months ago with chronic sinus problems, more than likely secondary to allergies.  Given triamcinolone 40 mg injection, helped for about a month.  Currently having sinus problems and allergy issues again.  Nondiabetic, no side effects or problems with the steroid injection.    He also has a lesion on the left side of his nose, somewhat friable, not changing very much, would like to have it evaluated.    Last wellness exam was 05/03/2023.        Review of Systems   Constitutional: Negative.  Negative for fatigue and fever.   HENT: Negative.  Negative for sore throat and trouble swallowing.    Eyes: Negative.  Negative for redness and visual disturbance.   Respiratory: Negative.  Negative for chest tightness and shortness of breath.    Cardiovascular: Negative.  Negative for chest pain.   Gastrointestinal: Negative.  Negative for abdominal pain, blood in stool and nausea.   Endocrine: Negative.  Negative for cold intolerance, heat intolerance and polyuria.   Genitourinary: Negative.    Musculoskeletal: Negative.  Negative for arthralgias, gait problem and joint swelling.   Integumentary:  Negative for rash. Negative.   Neurological: Negative.  Negative for dizziness, weakness and headaches.   Psychiatric/Behavioral: Negative.  Negative for agitation and dysphoric mood.          Objective:   Visit Vitals  /80   Pulse (!) 53   Resp 18   Ht 5' 6" (1.676 m)   Wt 83.9 kg (185 lb)   SpO2 100%   BMI 29.86 kg/m²        Physical Exam  Constitutional:       Appearance: Normal appearance.   HENT:      Head: Normocephalic.      Mouth/Throat:      Mouth: Mucous membranes are moist.      Pharynx: Oropharynx is clear.   Eyes:      Conjunctiva/sclera: Conjunctivae normal.      Pupils: Pupils " are equal, round, and reactive to light.   Cardiovascular:      Rate and Rhythm: Normal rate and regular rhythm.      Heart sounds: Normal heart sounds.   Pulmonary:      Effort: Pulmonary effort is normal.      Breath sounds: Normal breath sounds.   Abdominal:      General: Abdomen is flat.      Palpations: Abdomen is soft.   Musculoskeletal:         General: Normal range of motion.      Cervical back: Neck supple.   Skin:     General: Skin is warm and dry.   Neurological:      General: No focal deficit present.      Mental Status: He is alert and oriented to person, place, and time.   Psychiatric:         Mood and Affect: Mood normal.         Behavior: Behavior normal.         Thought Content: Thought content normal.         Judgment: Judgment normal.           Lab Results   Component Value Date     04/27/2023    K 4.4 04/27/2023    CO2 23 04/27/2023    BUN 18.5 04/27/2023    CREATININE 0.99 04/27/2023    CALCIUM 9.7 04/27/2023    ALBUMIN 3.7 04/27/2023    BILITOT 0.9 04/27/2023    ALKPHOS 59 04/27/2023    AST 24 04/27/2023    ALT 16 04/27/2023    EGFRNORACEVR >60 04/27/2023     Lab Results   Component Value Date    WBC 8.6 04/27/2023    RBC 4.88 04/27/2023    HGB 16.1 04/27/2023    HCT 47.2 04/27/2023    MCV 96.7 (H) 04/27/2023    RDW 13.6 04/27/2023     04/27/2023      Lab Results   Component Value Date    CHOL 210 (H) 04/27/2023    TRIG 101 04/27/2023    HDL 53 04/27/2023    .00 04/27/2023    TOTALCHOLEST 4 04/27/2023     Lab Results   Component Value Date    TSH 2.639 04/27/2023     Lab Results   Component Value Date    HGBA1C 5.4 04/27/2023        Lab Results   Component Value Date    PSA 0.79 04/27/2023         Assessment:     Problem List Items Addressed This Visit          ENT    Acute recurrent maxillary sinusitis (Chronic)    Current Assessment & Plan     Continue current treatment plan, I suggested possibly taking Singulair in addition to his Allegra and the fluticasone, he decided  to wait on this.         Relevant Medications    triamcinolone acetonide injection 40 mg       Derm    Facial lesion (Chronic)    Overview     Dermatology referral placed for nasal/facial lesion.         Relevant Orders    Ambulatory referral/consult to Dermatology       Cardiac/Vascular    Dyslipidemia (Chronic)    Overview     Not prescribed any lipid-lowering medication.         Current Assessment & Plan     Component Ref Range & Units 5 mo ago 4 yr ago 5 yr ago   Cholesterol Total <=200 mg/dL 210 High   204 High  R  242 High  R    HDL Cholesterol 35 - 60 mg/dL 53  62 High   53    Triglyceride 34 - 140 mg/dL 101  100 R  255 High  R    Cholesterol/HDL Ratio 0 - 5 4  3.3 R  4.6 R    Very Low Density Lipoprotein  20  20 R  51 R    LDL Cholesterol 50.00 - 140.00 mg/dL 137.00  122 R  138 High       Most recent cholesterol/lipid blood testing shows adequate control, continue current treatment plan, including prescription medications if prescribed, and/or diet high in fiber, low in saturated fats as discussed during clinic visit.         Relevant Orders    Lipid Panel    Hemoglobin A1C    TSH       Endocrine    Vitamin D deficiency (Chronic)    Current Assessment & Plan     Continue vitamin-D supplementation at current level.         Relevant Orders    Vitamin D    Overweight (BMI 25.0-29.9) (Chronic)    Current Assessment & Plan     Weight loss, healthy dietary choices, increased activity and exercise are all recommended.            Other    Fatigue - Primary (Chronic)    Current Assessment & Plan     Patient seen previously with chronic fatigue.         Relevant Orders    CBC Auto Differential    Vitamin D    Nicotine dependence, other tobacco product, uncomplicated (Chronic)    Current Assessment & Plan     Patient smokes a pipe, does not inhale.  Cessation of tobacco use in any form was discussed and strongly encouraged.  Assistance offered, information will be provided.  If not ready to quit now, patient will  contact this clinic in the future if I can be of any assistance related to the discontinuation of tobacco use/smoking.          Other Visit Diagnoses       Wellness examination        This diagnosis does not apply to this visit, wellness exam with pre visit labs will be scheduled/ordered for future visit.    Relevant Orders    Comprehensive Metabolic Panel    CBC Auto Differential    Lipid Panel    Hemoglobin A1C    TSH    Screening for prostate cancer        This diagnosis does not apply to this visit, appropriate prostate cancer screening will be ordered for next visit/wellness exam.    Relevant Orders    PSA, Screening    Abnormal blood chemistry        Most recent comprehensive metabolic panel did show one or more abnormal values, requiring further testing for investigatory purposes.               Current Outpatient Medications   Medication Instructions    fexofenadine (ALLEGRA) 180 mg, Oral, Daily    fluticasone propionate (FLONASE) 50 mcg/actuation nasal spray 2 sprays, Each Nostril, Daily    potassium citrate (UROCIT-K) 10 mEq (1,080 mg) TbSR 10 mEq, Oral, 3 times daily     Plan:             Follow up in about 7 months (around 5/6/2024) for Medicare Wellness.

## 2023-10-10 NOTE — ASSESSMENT & PLAN NOTE
Component Ref Range & Units 5 mo ago 4 yr ago 5 yr ago   Cholesterol Total <=200 mg/dL 210 High   204 High  R  242 High  R    HDL Cholesterol 35 - 60 mg/dL 53  62 High   53    Triglyceride 34 - 140 mg/dL 101  100 R  255 High  R    Cholesterol/HDL Ratio 0 - 5 4  3.3 R  4.6 R    Very Low Density Lipoprotein  20  20 R  51 R    LDL Cholesterol 50.00 - 140.00 mg/dL 137.00  122 R  138 High       Most recent cholesterol/lipid blood testing shows adequate control, continue current treatment plan, including prescription medications if prescribed, and/or diet high in fiber, low in saturated fats as discussed during clinic visit.

## 2023-10-11 ENCOUNTER — OFFICE VISIT (OUTPATIENT)
Dept: PRIMARY CARE CLINIC | Facility: CLINIC | Age: 78
End: 2023-10-11
Payer: COMMERCIAL

## 2023-10-11 VITALS
WEIGHT: 185 LBS | BODY MASS INDEX: 29.73 KG/M2 | RESPIRATION RATE: 18 BRPM | HEART RATE: 53 BPM | HEIGHT: 66 IN | DIASTOLIC BLOOD PRESSURE: 80 MMHG | SYSTOLIC BLOOD PRESSURE: 132 MMHG | OXYGEN SATURATION: 100 %

## 2023-10-11 DIAGNOSIS — J01.01 ACUTE RECURRENT MAXILLARY SINUSITIS: Chronic | ICD-10-CM

## 2023-10-11 DIAGNOSIS — R79.9 ABNORMAL BLOOD CHEMISTRY: ICD-10-CM

## 2023-10-11 DIAGNOSIS — R53.83 FATIGUE, UNSPECIFIED TYPE: Primary | Chronic | ICD-10-CM

## 2023-10-11 DIAGNOSIS — Z00.00 WELLNESS EXAMINATION: ICD-10-CM

## 2023-10-11 DIAGNOSIS — E66.3 OVERWEIGHT (BMI 25.0-29.9): ICD-10-CM

## 2023-10-11 DIAGNOSIS — L98.9 FACIAL LESION: Chronic | ICD-10-CM

## 2023-10-11 DIAGNOSIS — E55.9 VITAMIN D DEFICIENCY: Chronic | ICD-10-CM

## 2023-10-11 DIAGNOSIS — F17.290 NICOTINE DEPENDENCE, OTHER TOBACCO PRODUCT, UNCOMPLICATED: Chronic | ICD-10-CM

## 2023-10-11 DIAGNOSIS — E78.5 DYSLIPIDEMIA: Chronic | ICD-10-CM

## 2023-10-11 DIAGNOSIS — Z12.5 SCREENING FOR PROSTATE CANCER: ICD-10-CM

## 2023-10-11 PROBLEM — E66.09 CLASS 1 OBESITY DUE TO EXCESS CALORIES WITH SERIOUS COMORBIDITY AND BODY MASS INDEX (BMI) OF 30.0 TO 30.9 IN ADULT: Status: RESOLVED | Noted: 2023-01-22 | Resolved: 2023-10-11

## 2023-10-11 PROBLEM — E66.811 CLASS 1 OBESITY DUE TO EXCESS CALORIES WITH SERIOUS COMORBIDITY AND BODY MASS INDEX (BMI) OF 30.0 TO 30.9 IN ADULT: Status: RESOLVED | Noted: 2023-01-22 | Resolved: 2023-10-11

## 2023-10-11 PROCEDURE — 1159F PR MEDICATION LIST DOCUMENTED IN MEDICAL RECORD: ICD-10-PCS | Mod: CPTII,,, | Performed by: GENERAL PRACTICE

## 2023-10-11 PROCEDURE — 3075F PR MOST RECENT SYSTOLIC BLOOD PRESS GE 130-139MM HG: ICD-10-PCS | Mod: CPTII,,, | Performed by: GENERAL PRACTICE

## 2023-10-11 PROCEDURE — 99214 PR OFFICE/OUTPT VISIT, EST, LEVL IV, 30-39 MIN: ICD-10-PCS | Mod: ,,, | Performed by: GENERAL PRACTICE

## 2023-10-11 PROCEDURE — 99214 OFFICE O/P EST MOD 30 MIN: CPT | Mod: ,,, | Performed by: GENERAL PRACTICE

## 2023-10-11 PROCEDURE — 3079F PR MOST RECENT DIASTOLIC BLOOD PRESSURE 80-89 MM HG: ICD-10-PCS | Mod: CPTII,,, | Performed by: GENERAL PRACTICE

## 2023-10-11 PROCEDURE — 3075F SYST BP GE 130 - 139MM HG: CPT | Mod: CPTII,,, | Performed by: GENERAL PRACTICE

## 2023-10-11 PROCEDURE — 1160F PR REVIEW ALL MEDS BY PRESCRIBER/CLIN PHARMACIST DOCUMENTED: ICD-10-PCS | Mod: CPTII,,, | Performed by: GENERAL PRACTICE

## 2023-10-11 PROCEDURE — 3079F DIAST BP 80-89 MM HG: CPT | Mod: CPTII,,, | Performed by: GENERAL PRACTICE

## 2023-10-11 PROCEDURE — 1159F MED LIST DOCD IN RCRD: CPT | Mod: CPTII,,, | Performed by: GENERAL PRACTICE

## 2023-10-11 PROCEDURE — 1160F RVW MEDS BY RX/DR IN RCRD: CPT | Mod: CPTII,,, | Performed by: GENERAL PRACTICE

## 2023-10-11 RX ORDER — TRIAMCINOLONE ACETONIDE 40 MG/ML
40 INJECTION, SUSPENSION INTRA-ARTICULAR; INTRAMUSCULAR
Status: SHIPPED | OUTPATIENT
Start: 2023-10-11

## 2023-10-11 NOTE — ASSESSMENT & PLAN NOTE
Continue current treatment plan, I suggested possibly taking Singulair in addition to his Allegra and the fluticasone, he decided to wait on this.

## 2023-10-11 NOTE — PATIENT INSTRUCTIONS
Increased fluid intake.  Ibuprofen or Tylenol for sinus pain/fever as needed.  Delsym with guaifenesin or Mucinex DM as needed for cough and congestion.  Seek further medical care for any further significant problems.

## 2023-12-26 ENCOUNTER — TELEPHONE (OUTPATIENT)
Dept: PRIMARY CARE CLINIC | Facility: CLINIC | Age: 78
End: 2023-12-26

## 2023-12-26 NOTE — TELEPHONE ENCOUNTER
----- Message from Gaurav Vu sent at 12/26/2023 11:35 AM CST -----  Type:  Needs Medical Advice    Who Called: pt   Would the patient rather a call back or a response via [facility]? Call back to r/s  Best Call Back Number: [phone]    Additional Information: pt called to r/s surgery clearance appt scheduled on 01/03, there is nothing avail. Please call patient to r/s . Pt surgery is on 01/26/24

## 2024-01-05 ENCOUNTER — OFFICE VISIT (OUTPATIENT)
Dept: PRIMARY CARE CLINIC | Facility: CLINIC | Age: 79
End: 2024-01-05
Payer: COMMERCIAL

## 2024-01-05 VITALS
SYSTOLIC BLOOD PRESSURE: 138 MMHG | WEIGHT: 184 LBS | BODY MASS INDEX: 29.57 KG/M2 | OXYGEN SATURATION: 99 % | HEIGHT: 66 IN | DIASTOLIC BLOOD PRESSURE: 70 MMHG | RESPIRATION RATE: 18 BRPM | HEART RATE: 58 BPM

## 2024-01-05 DIAGNOSIS — Z01.818 PREOPERATIVE CLEARANCE: Primary | ICD-10-CM

## 2024-01-05 PROCEDURE — 3075F SYST BP GE 130 - 139MM HG: CPT | Mod: CPTII,,, | Performed by: GENERAL PRACTICE

## 2024-01-05 PROCEDURE — 3288F FALL RISK ASSESSMENT DOCD: CPT | Mod: CPTII,,, | Performed by: GENERAL PRACTICE

## 2024-01-05 PROCEDURE — 1101F PT FALLS ASSESS-DOCD LE1/YR: CPT | Mod: CPTII,,, | Performed by: GENERAL PRACTICE

## 2024-01-05 PROCEDURE — 1160F RVW MEDS BY RX/DR IN RCRD: CPT | Mod: CPTII,,, | Performed by: GENERAL PRACTICE

## 2024-01-05 PROCEDURE — 1159F MED LIST DOCD IN RCRD: CPT | Mod: CPTII,,, | Performed by: GENERAL PRACTICE

## 2024-01-05 PROCEDURE — 99213 OFFICE O/P EST LOW 20 MIN: CPT | Mod: ,,, | Performed by: GENERAL PRACTICE

## 2024-01-05 PROCEDURE — 3078F DIAST BP <80 MM HG: CPT | Mod: CPTII,,, | Performed by: GENERAL PRACTICE

## 2024-01-05 NOTE — ASSESSMENT & PLAN NOTE
Based on today's evaluation, there does not seem to be any contraindication to the patient undergoing surgery which will utilize local anesthesia or a digital block with or without conscious monitored sedation.  Patient is medically stable to proceed with surgery as planned.

## 2024-01-05 NOTE — PROGRESS NOTES
"Subjective:       Patient ID: Javier De Santiago is a 78 y.o. male.    Chief Complaint: surgical clearance (Dr Cho right eye cataract)    Established patient, presents for the purpose of preoperative clearance to have right cataract surgery.  He does not suffer with any cardiac or pulmonary problems that would preclude this type of procedure.      Review of Systems   Constitutional: Negative.  Negative for fatigue and fever.   HENT: Negative.  Negative for sore throat and trouble swallowing.    Eyes: Negative.  Negative for redness and visual disturbance.   Respiratory: Negative.  Negative for chest tightness and shortness of breath.    Cardiovascular: Negative.  Negative for chest pain.   Gastrointestinal: Negative.  Negative for abdominal pain, blood in stool and nausea.   Endocrine: Negative.  Negative for cold intolerance, heat intolerance and polyuria.   Genitourinary: Negative.    Musculoskeletal: Negative.  Negative for arthralgias, gait problem and joint swelling.   Integumentary:  Negative for rash. Negative.   Neurological: Negative.  Negative for dizziness, weakness and headaches.   Psychiatric/Behavioral: Negative.  Negative for agitation and dysphoric mood.            Patient Care Team:  Mick Lorenzana MD as PCP - General (Family Medicine)  Jatin Regan MD as Consulting Physician (Urology)  Ousmane Cho MD as Consulting Physician (Ophthalmology)  Objective:   Visit Vitals  /70   Pulse (!) 58   Resp 18   Ht 5' 6" (1.676 m)   Wt 83.5 kg (184 lb)   SpO2 99%   BMI 29.70 kg/m²        Physical Exam  Constitutional:       Appearance: Normal appearance.   HENT:      Head: Normocephalic.      Mouth/Throat:      Mouth: Mucous membranes are moist.      Pharynx: Oropharynx is clear.   Eyes:      Conjunctiva/sclera: Conjunctivae normal.      Pupils: Pupils are equal, round, and reactive to light.   Cardiovascular:      Rate and Rhythm: Normal rate and regular rhythm.      Heart sounds: Normal heart " sounds.   Pulmonary:      Effort: Pulmonary effort is normal.      Breath sounds: Normal breath sounds.   Abdominal:      General: Abdomen is flat.      Palpations: Abdomen is soft.   Musculoskeletal:         General: Normal range of motion.      Cervical back: Neck supple.   Skin:     General: Skin is warm and dry.   Neurological:      General: No focal deficit present.      Mental Status: He is alert and oriented to person, place, and time.   Psychiatric:         Mood and Affect: Mood normal.         Behavior: Behavior normal.         Thought Content: Thought content normal.         Judgment: Judgment normal.           Lab Results   Component Value Date     04/27/2023    K 4.4 04/27/2023    CO2 23 04/27/2023    BUN 18.5 04/27/2023    CREATININE 0.99 04/27/2023    CALCIUM 9.7 04/27/2023    ALBUMIN 3.7 04/27/2023    BILITOT 0.9 04/27/2023    ALKPHOS 59 04/27/2023    AST 24 04/27/2023    ALT 16 04/27/2023    EGFRNORACEVR >60 04/27/2023     Lab Results   Component Value Date    WBC 8.6 04/27/2023    RBC 4.88 04/27/2023    HGB 16.1 04/27/2023    HCT 47.2 04/27/2023    MCV 96.7 (H) 04/27/2023    RDW 13.6 04/27/2023     04/27/2023      Lab Results   Component Value Date    CHOL 210 (H) 04/27/2023    TRIG 101 04/27/2023    HDL 53 04/27/2023    .00 04/27/2023    TOTALCHOLEST 4 04/27/2023     Lab Results   Component Value Date    TSH 2.639 04/27/2023     Lab Results   Component Value Date    HGBA1C 5.4 04/27/2023        Lab Results   Component Value Date    PSA 0.79 04/27/2023         Assessment:       ICD-10-CM ICD-9-CM   1. Preoperative clearance  Z01.818 V72.84       Current Outpatient Medications   Medication Instructions    fexofenadine (ALLEGRA) 180 mg, Oral, Daily    fluticasone propionate (FLONASE) 50 mcg/actuation nasal spray 2 sprays, Each Nostril, Daily    potassium citrate (UROCIT-K) 10 mEq (1,080 mg) TbSR 10 mEq, Oral, 3 times daily     Plan:     Problem List Items Addressed This Visit           Other    Preoperative clearance - Primary    Current Assessment & Plan     Based on today's evaluation, there does not seem to be any contraindication to the patient undergoing surgery which will utilize local anesthesia or a digital block with or without conscious monitored sedation.  Patient is medically stable to proceed with surgery as planned.                    Follow up for routine medical care as scheduled.

## 2024-02-07 ENCOUNTER — PATIENT MESSAGE (OUTPATIENT)
Dept: ADMINISTRATIVE | Facility: OTHER | Age: 79
End: 2024-02-07
Payer: MEDICARE

## 2024-04-24 ENCOUNTER — TELEPHONE (OUTPATIENT)
Dept: PRIMARY CARE CLINIC | Facility: CLINIC | Age: 79
End: 2024-04-24
Payer: MEDICARE

## 2024-04-26 ENCOUNTER — TELEPHONE (OUTPATIENT)
Dept: PRIMARY CARE CLINIC | Facility: CLINIC | Age: 79
End: 2024-04-26
Payer: MEDICARE

## 2024-04-26 NOTE — TELEPHONE ENCOUNTER
----- Message from Dede Varner LPN sent at 4/25/2024  4:19 PM CDT -----    ----- Message -----  From: Jorge Glover  Sent: 4/25/2024   3:39 PM CDT  To: [facility] Staff    .Type:  Needs Medical Advice    Who Called: [facility]  Symptoms (please be specific):    How long has patient had these symptoms:    Pharmacy name and phone #:    Would the patient rather a call back or a response via [facility]? Call back  Best Call Back Number: [phone]  Additional Information: calling to discuss surgical clearance,

## 2024-04-29 DIAGNOSIS — Z01.818 PRE-OPERATIVE CLEARANCE: Primary | ICD-10-CM

## 2024-05-01 ENCOUNTER — HOSPITAL ENCOUNTER (OUTPATIENT)
Dept: RADIOLOGY | Facility: HOSPITAL | Age: 79
Discharge: HOME | End: 2024-05-01
Attending: GENERAL PRACTICE
Payer: MEDICARE

## 2024-05-01 DIAGNOSIS — Z01.818 PRE-OPERATIVE CLEARANCE: ICD-10-CM

## 2024-05-01 PROCEDURE — 71046 X-RAY EXAM CHEST 2 VIEWS: CPT | Mod: TC

## 2024-05-08 NOTE — PROGRESS NOTES
Patient ID: [id number]     Chief Complaint: Annual Exam and Pre-op Exam (Sinus Surgery - Not scheduled )      HPI:     Javier De Santiago is a 78 y.o. male here today for a Medicare Wellness.     A separate E/M code has been provided to evaluate additional complaint(s) that the patient would like addressed during the dedicated Medicare Wellness Exam.    Patient needs preoperative clearance to have a sinus surgery that apparently will involve general anesthesia.  Notably, preoperative workup did show an abnormal EKG, without medical history of any significant cardiac problems.  He does remember seeing a cardiologist in 2017 for complete evaluation.  He does not see a cardiologist on a regular basis.  He does not remember ever having a myocardial infarction or any other significant cardiac issues which were documented.  Also, he does have an elevated white blood cell count with what appears to be lymphocytosis.  While this may be a normal variant, or a transient elevation, I will order a flow cytometry test to rule out more serious issues.    Also, he is requesting a corticosteroid injection, he does have migratory type arthritic symptoms but currently is having right shoulder pain, something that he has had for the past few weeks.  Pain does not seem to be radiating from his neck at this time.  No recent or apparent injury.    -------------------------------------    Class 1 obesity due to excess calories with serious comorbidity and body mass index (BMI) of 30.0 to 30.9 in adult    Fatigue    H/O colectomy    H/O ileostomy    Hyperlipidemia        Past Surgical History:   Procedure Laterality Date    COLECTOMY      CYSTO, RETROGRADE PYELOGRAM,BALLOON DILATION, STENT PLACEMENT  01/24/2020    ILEOSTOMY      PHACOEMULSIFICATION, CATARACT, WITH IOL INSERTION Left 2/9/2024    Procedure: PHACOEMULSIFICATION, CATARACT, WITH IOL INSERTION- OS;  Surgeon: Ousmane Cho MD;  Location: [facility];  Service: Ophthalmology;   Laterality: Left;    PHACOEMULSIFICATION, CATARACT, WITH IOL INSERTION Right 3/19/2024    Procedure: PHACOEMULSIFICATION, CATARACT, WITH IOL INSERTION- OD;  Surgeon: Ousmane Cho MD;  Location[facility] OR;  Service: Ophthalmology;  Laterality: Right;    TONSILLECTOMY         Review of patient's allergies indicates:  No Known Allergies    No outpatient medications have been marked as taking for the 5/9/24 encounter (Office Visit) with Mick Lorenzana MD.     Current Facility-Administered Medications for the 5/9/24 encounter (Office Visit) with Mick Lorenzana MD   Medication Dose Route Frequency Provider Last Rate Last Admin    [COMPLETED] betamethasone acetate-betamethasone sodium phosphate injection 12 mg  12 mg Intramuscular 1 time in Clinic/HOD    12 mg at 05/09/24 1116    [DISCONTINUED] triamcinolone acetonide injection 40 mg  40 mg Intramuscular 1 time in Clinic/HOD Mick Lorenzana MD           Social History     Socioeconomic History    Marital status:    Tobacco Use    Smoking status: Every Day     Types: Pipe    Smokeless tobacco: Never    Tobacco comments:     A pipe a day keeps the ...   Substance and Sexual Activity    Drug use: Never        Family History   Problem Relation Name Age of Onset    Dementia Mother      Heart failure Father          Patient Care Team:  Mick Lorenzana MD as PCP - General (Family Medicine)  Jatin Regan MD as Consulting Physician (Urology)  Ousmane Cho MD as Consulting Physician (Ophthalmology)  Benoit Vincent MD as Consulting Physician (Cardiology)     Opioid Screening: Patient medication list reviewed, patient is not taking prescription opioids. Patient is not using additional opioids than prescribed. Patient is at low risk of substance abuse based on this opioid use history.       Subjective:     Review of Systems   Constitutional: Negative.  Negative for malaise/fatigue and weight loss.   HENT: Negative.     Eyes: Negative.    Respiratory: Negative.   Negative for shortness of breath.    Cardiovascular: Negative.  Negative for chest pain.   Gastrointestinal: Negative.    Genitourinary: Negative.    Musculoskeletal:  Positive for joint pain (Right shoulder pain).   Skin: Negative.    Neurological: Negative.  Negative for focal weakness, weakness and headaches.   Endo/Heme/Allergies: Negative.    Psychiatric/Behavioral: Negative.  Negative for depression and memory loss. The patient is not nervous/anxious.          Patient Reported Health Risk Assessment  What [age] 70-79  Are you male or female?: Male  During the past four weeks, how much have you been bothered by emotional problems such as feeling anxious, depressed, irritable, sad, or downhearted and blue?: Not at all  During the past five weeks, has your physical and/or emotional health limited your social activities with family, friends, neighbors, or groups?: Not at all  During the past four weeks, how much bodily pain have you generally had?: No pain  During the past four weeks, was someone available to help if you needed and wanted help?: Yes, as much as I wanted  During the past four weeks, what was the hardest physical activity you could do for at least two minutes?: Light  Can you get to places out of walking distance without help?  (For example, can you travel alone on buses or taxis, or drive your own car?): Yes  Can you go shopping for groceries or clothes without someone's help?: Yes  Can you prepare your own meals?: Yes  Can you do your own housework without help?: Yes  Because of any health problems, do you need the help of another person with your personal care needs such as eating, bathing, dressing, or getting around the house?: No  Can you handle your own money without help?: Yes  During the past four weeks, how would you rate your health in general?: Good  How have things been going for you during the past four weeks?: Pretty well  Are you having difficulties driving your car?: No  Do  "you always fasten your seat belt when you are in a car?: Yes, usually  How often in the past four weeks have you been bothered by falling or dizzy when standing up?: Never  How often in the past four weeks have you been bothered by sexual problems?: Never  How often in the past four weeks have you been bothered by trouble eating well?: Never  How often in the past four weeks have you been bothered by teeth or denture problems?: Never  How often in the past four weeks have you been bothered with problems using the telephone?: Never  How often in the past four weeks have you been bothered by tiredness or fatigue?: Never  Have you fallen two or more times in the past year?: No  Are you afraid of falling?: No  Are you a smoker?: No  During the past four weeks, how many drinks of wine, beer, or other alcoholic beverages did you have?: One drink or less per week  Do you exercise for about 20 minutes three or more days a week?: No, I usually do not exercise this much  Have you been given any information to help you with hazards in your house that might hurt you?: No  Have you been given any information to help you with keeping track of your medications?: No  How often do you have trouble taking medicines the way you've been told to take them?: I always take them as prescribed  How confident are you that you can control and manage most of your health problems?: Very confident  What is your race? (Check all that apply.):     Objective:     /76   Pulse 60   Resp 18   Ht 5' 6" (1.676 m)   Wt 84.8 kg (187 lb)   SpO2 98%   BMI 30.18 kg/m²     Physical Exam  Constitutional:       Appearance: Normal appearance.   HENT:      Head: Normocephalic.      Mouth/Throat:      Mouth: Mucous membranes are moist.      Pharynx: Oropharynx is clear.   Eyes:      Conjunctiva/sclera: Conjunctivae normal.      Pupils: Pupils are equal, round, and reactive to light.   Cardiovascular:      Rate and Rhythm: Normal rate and " regular rhythm.      Heart sounds: Normal heart sounds.   Pulmonary:      Effort: Pulmonary effort is normal.      Breath sounds: Normal breath sounds.   Abdominal:      General: Abdomen is flat.      Palpations: Abdomen is soft.   Musculoskeletal:         General: Tenderness (Pain with active and passive range of motion of the right shoulder) present. Normal range of motion.      Cervical back: Neck supple.   Skin:     General: Skin is warm and dry.   Neurological:      General: No focal deficit present.      Mental Status: He is alert and oriented to person, place, and time.   Psychiatric:         Mood and Affect: Mood normal.         Behavior: Behavior normal.         Thought Content: Thought content normal.         Judgment: Judgment normal.         Lab Results   Component Value Date     05/01/2024    K 4.3 05/01/2024    CO2 26 05/01/2024    BUN 17.2 05/01/2024    CREATININE 1.07 05/01/2024    CALCIUM 9.9 05/01/2024    ALBUMIN 4.0 05/01/2024    BILITOT 0.6 05/01/2024    ALKPHOS 58 05/01/2024    AST 23 05/01/2024    ALT 21 05/01/2024    EGFRNORACEVR >60 05/01/2024     Lab Results   Component Value Date    WBC 17.00 (H) 05/01/2024    WBC 17 05/01/2024    RBC 4.72 05/01/2024    HGB 15.4 05/01/2024    HCT 45.6 05/01/2024    MCV 96.6 (H) 05/01/2024    RDW 13.2 05/01/2024     05/01/2024      Lab Results   Component Value Date    CHOL 197 05/01/2024    TRIG 238 (H) 05/01/2024    HDL 47 05/01/2024    .00 05/01/2024    TOTALCHOLEST 4 05/01/2024     Lab Results   Component Value Date    TSH 1.235 05/01/2024     Lab Results   Component Value Date    HGBA1C 5.5 05/01/2024        Lab Results   Component Value Date    PSA 0.92 05/01/2024              No data to display                  1/5/2024     8:30 AM 5/3/2023    10:30 AM   Fall Risk Assessment - Outpatient   Mobility Status Ambulatory Ambulatory   Number of falls 0 0   Identified as fall risk False False           Depression Screening  Over the past  two weeks, has the patient felt down, depressed, or hopeless?: No  Over the past two weeks, has the patient felt little interest or pleasure in doing things?: No  Functional Ability/Safety Screening  Was the patient's timed Up & Go test unsteady or longer than 30 seconds?: No  Does the patient need help with phone, transportation, shopping, preparing meals, housework, laundry, meds, or managing money?: No  Does the patient's home have rugs in the hallway, lack grab bars in the bathroom, lack handrails on the stairs or have poor lighting?: No  Have you noticed any hearing difficulties?: No  Cognitive Function (Assessed through direct observation with due consideration of information obtained by way of patient reports and/or concerns raised by family, friends, caretakers, or others)    Does the patient repeat questions/statements in the same day?: No  Does the patient have trouble remembering the date, year, and time?: No  Does the patient have difficulty managing finances?: No  Does the patient have a decreased sense of direction?: No  Assessment:       ICD-10-CM ICD-9-CM   1. Medicare annual wellness visit, subsequent  Z00.00 V70.0   2. Screening for prostate cancer  Z12.5 V76.44   3. Dyslipidemia  E78.5 272.4   4. Vitamin D deficiency  E55.9 268.9   5. Nicotine dependence, other tobacco product, uncomplicated  F17.290 305.1   6. Preoperative clearance  Z01.818 V72.84   7. Lymphocytosis  D72.820 288.61   8. Abnormal EKG  R94.31 794.31   9. Acute pain of right shoulder  M25.511 719.41        Plan:     Medicare Annual Wellness and Personalized Prevention Plan:   Fall Risk + Home Safety + Hearing Impairment + Depression Screen + Cognitive Impairment Screen + Health Risk Assessment all reviewed.       Health Maintenance Topics with due status: Not Due       Topic Last Completion Date    Influenza Vaccine 11/18/2022    Lipid Panel 05/01/2024      The patient's Health Maintenance was reviewed and the following appears to  be due at this time:   There are no preventive care reminders to display for this patient.    Health risk assessment:  After review of the patient's medical record as it relates to health risk assessment over the next 5-10 years per recommendations of the USPSTF, it was determined that all pertinent recommendations have been appropriately addressed. The patient does not desire any further preventative care measures or screening tests at this time.  We will continue to reassess at each annual visit.    1. Medicare annual wellness visit, subsequent  Comments:  Overall health status was reviewed.  Good health habits reinforced.  Annual wellness exams recommended.    2. Screening for prostate cancer  Comments:  Prostate specific antigen blood test obtained was essentially normal, suggesting that there is no current concern for prostate cancer.  Digital exam deferred.    3. Dyslipidemia  Overview:  Dyslipidemia is being treated using lifestyle modification only.  Patient is not being prescribed lipid-lowering medication.  As discussed, we will continue to monitor this, and may ultimately choose to prescribe medication if this becomes difficult to control utilizing lifestyle modification only.      4. Vitamin D deficiency  Overview:  Continue vitamin-D supplementation at current level.  Levels will be checked annually.      5. Nicotine dependence, other tobacco product, uncomplicated  Overview:  Cessation of tobacco use in any form was discussed and strongly encouraged.  Assistance offered, information will be provided.  If not ready to quit now, patient will contact this clinic in the future if I can be of any assistance related to the discontinuation of tobacco use/smoking.      6. Preoperative clearance  -     Ambulatory referral/consult to Cardiology; Future; Expected date: 05/10/2024    7. Lymphocytosis  Overview:  Flow cytometry will be ordered.    Assessment & Plan:             Component Ref Range & Units 8 d  ago  (5/1/24) 8 d ago  (5/1/24) 1 yr ago  (4/27/23) 4 yr ago  (1/8/20) 4 yr ago  (1/8/20) 5 yr ago  (4/10/19) 5 yr ago  (4/10/19)   WBC x10(3)/mcL 17 17.00 High  R 8.6 R  12.5 High  R 8.8 R    Neutrophils % % 48         Lymphs % % 46         Monocytes % % 4  8.7 10   8   Eosinophils % % 2  3.4 1   2   Metamyelocytes % <=0 % 1 High          Neutrophils Abs 2.1 - 9.2 x10(3)/mcL 8.16    9.37 High  R 5.86 R    Lymphs Abs 0.6 - 4.6 x10(3)/mcL 7.82 High   3.74 1.7   2.0   Monocytes Abs 0.1 - 1.3 x10(3)/mcL 0.68  0.75 1.2   0.7   Eosinophils Abs 0 - 0.9 x10(3)/mcL 0.34  0.29 0.1 R   0.2 R   Platelets Normal, Adequate Normal         RBC Morph Normal Normal                     Component Ref Range & Units 8 d ago 1 yr ago 4 yr ago 5 yr ago   WBC 4.50 - 11.50 x10(3)/mcL 17.00 High  8.6 R 12.5 High  R 8.8 R   RBC 4.70 - 6.10 x10(6)/mcL 4.72 4.88 4.84 5.23   Hgb 14.0 - 18.0 g/dL 15.4 16.1 15.5 R 16.8 R   Hct 42.0 - 52.0 % 45.6 47.2 45.0 49.4   MCV 80.0 - 94.0 fL 96.6 High  96.7 High  93.0 94.5 High    MCH 27.0 - 31.0 pg 32.6 High  33.0 High  32.0 High  32.1 High    MCHC 33.0 - 36.0 g/dL 33.8 34.1 34.4 R 34.0 R   RDW 11.5 - 17.0 % 13.2 13.6 12.5 13.4   Platelet 130 - 400 x10(3)/mcL 210 205 220 219   MPV 7.4 - 10.4 fL 10.6 High  11.3 High  9.7 R 11.7 R   NRBC% % 0.0             Orders:  -     Cancel: Flow Cytometry Panel; Future; Expected date: 05/09/2024  -     Flow Cytometry Panel; Future; Expected date: 05/09/2024    8. 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  Because of the abnormal EKG and the need for preoperative clearance for general anesthesia, a cardiology referral will be placed    Orders:  -     Ambulatory referral/consult to Cardiology; Future; Expected date: 05/10/2024    9. Acute pain of right shoulder  Overview:  Several weeks of right  shoulder pain, no apparent injury, does have some arthritic issues, requesting a corticosteroid injection if possible.    Orders:  -     betamethasone acetate-betamethasone sodium phosphate injection 12 mg            Advance Care Planning     Date: 05/08/2024    Living Will  During this visit, I engaged the patient  in the voluntary advance care planning process.  The patient and I reviewed the role for advance directives and their purpose in directing future healthcare if the patient's unable to speak for him/herself.  At this point in time, the patient does have full decision-making capacity.  We discussed different extreme health states that he could experience, and reviewed what kind of medical care he would want in those situations.  The patient communicated that if he were comatose and had little chance of a meaningful recovery, he would not want machines/life-sustaining treatments used. In addition to the above preference, other important end-of-life issues for the patient include no other issues.  Advanced care planning paperwork given to patient to bring home and discuss with the family.  Once signed, patient should bring form back for for the purposes of entering it into the medical record.  I spent a total of 5 minutes engaging the patient in this advance care planning discussion.              Current Outpatient Medications   Medication Instructions    fexofenadine (ALLEGRA) 180 mg, Oral, Daily    fluticasone propionate (FLONASE) 50 mcg/actuation nasal spray 2 sprays, Each Nostril, Daily    potassium citrate (UROCIT-K) 10 mEq (1,080 mg) TbSR 10 mEq, Oral, 3 times daily        No follow-ups on file. In addition to their scheduled follow up, the patient has also been instructed to follow up on as needed basis.

## 2024-05-09 ENCOUNTER — TELEPHONE (OUTPATIENT)
Dept: PRIMARY CARE CLINIC | Facility: CLINIC | Age: 79
End: 2024-05-09

## 2024-05-09 ENCOUNTER — OFFICE VISIT (OUTPATIENT)
Dept: PRIMARY CARE CLINIC | Facility: CLINIC | Age: 79
End: 2024-05-09
Payer: COMMERCIAL

## 2024-05-09 VITALS
BODY MASS INDEX: 30.05 KG/M2 | DIASTOLIC BLOOD PRESSURE: 76 MMHG | SYSTOLIC BLOOD PRESSURE: 138 MMHG | WEIGHT: 187 LBS | HEART RATE: 60 BPM | OXYGEN SATURATION: 98 % | RESPIRATION RATE: 18 BRPM | HEIGHT: 66 IN

## 2024-05-09 DIAGNOSIS — M25.511 ACUTE PAIN OF RIGHT SHOULDER: ICD-10-CM

## 2024-05-09 DIAGNOSIS — Z12.5 SCREENING FOR PROSTATE CANCER: ICD-10-CM

## 2024-05-09 DIAGNOSIS — Z00.00 MEDICARE ANNUAL WELLNESS VISIT, SUBSEQUENT: Primary | ICD-10-CM

## 2024-05-09 DIAGNOSIS — Z01.818 PREOPERATIVE CLEARANCE: ICD-10-CM

## 2024-05-09 DIAGNOSIS — E78.5 DYSLIPIDEMIA: Chronic | ICD-10-CM

## 2024-05-09 DIAGNOSIS — R94.31 ABNORMAL EKG: ICD-10-CM

## 2024-05-09 DIAGNOSIS — D72.820 LYMPHOCYTOSIS: Chronic | ICD-10-CM

## 2024-05-09 DIAGNOSIS — F17.290 NICOTINE DEPENDENCE, OTHER TOBACCO PRODUCT, UNCOMPLICATED: Chronic | ICD-10-CM

## 2024-05-09 DIAGNOSIS — E55.9 VITAMIN D DEFICIENCY: Chronic | ICD-10-CM

## 2024-05-09 PROCEDURE — 1158F ADVNC CARE PLAN TLK DOCD: CPT | Mod: CPTII,,, | Performed by: GENERAL PRACTICE

## 2024-05-09 PROCEDURE — 36415 COLL VENOUS BLD VENIPUNCTURE: CPT | Performed by: GENERAL PRACTICE

## 2024-05-09 PROCEDURE — 96372 THER/PROPH/DIAG INJ SC/IM: CPT | Mod: ,,, | Performed by: GENERAL PRACTICE

## 2024-05-09 PROCEDURE — 3078F DIAST BP <80 MM HG: CPT | Mod: CPTII,,, | Performed by: GENERAL PRACTICE

## 2024-05-09 PROCEDURE — 3075F SYST BP GE 130 - 139MM HG: CPT | Mod: CPTII,,, | Performed by: GENERAL PRACTICE

## 2024-05-09 PROCEDURE — 1160F RVW MEDS BY RX/DR IN RCRD: CPT | Mod: CPTII,,, | Performed by: GENERAL PRACTICE

## 2024-05-09 PROCEDURE — 1159F MED LIST DOCD IN RCRD: CPT | Mod: CPTII,,, | Performed by: GENERAL PRACTICE

## 2024-05-09 PROCEDURE — 99213 OFFICE O/P EST LOW 20 MIN: CPT | Mod: 25,,, | Performed by: GENERAL PRACTICE

## 2024-05-09 PROCEDURE — 88184 FLOWCYTOMETRY/ TC 1 MARKER: CPT | Performed by: GENERAL PRACTICE

## 2024-05-09 PROCEDURE — G0439 PPPS, SUBSEQ VISIT: HCPCS | Mod: ,,, | Performed by: GENERAL PRACTICE

## 2024-05-09 PROCEDURE — 88185 FLOWCYTOMETRY/TC ADD-ON: CPT

## 2024-05-09 RX ORDER — BETAMETHASONE SODIUM PHOSPHATE AND BETAMETHASONE ACETATE 3; 3 MG/ML; MG/ML
12 INJECTION, SUSPENSION INTRA-ARTICULAR; INTRALESIONAL; INTRAMUSCULAR; SOFT TISSUE
Status: COMPLETED | OUTPATIENT
Start: 2024-05-09 | End: 2024-05-09

## 2024-05-09 RX ADMIN — BETAMETHASONE SODIUM PHOSPHATE AND BETAMETHASONE ACETATE 12 MG: 3; 3 INJECTION, SUSPENSION INTRA-ARTICULAR; INTRALESIONAL; INTRAMUSCULAR; SOFT TISSUE at 11:05

## 2024-05-09 NOTE — PROGRESS NOTES
Patient verified name and .Patient here for nurse visit to draw Anemia labs with butterfly needle. Patient was drawn in right hand.No complications or issues occurred. Patient tolerated venipuncture well. Patient expressed no questions or concerns.

## 2024-05-09 NOTE — TELEPHONE ENCOUNTER
Contacted Jocelyne regarding her message. Informed her that I was going to fax her the info from patient's office visit

## 2024-05-09 NOTE — TELEPHONE ENCOUNTER
----- Message from Nessa Sims sent at 5/9/2024  2:06 PM CDT -----  Regarding: advice  Who Called: Jocelyne From [place] [facility]    Caller is requesting assistance/information from provider's office.    Best Call Back Number, if different:[phone]  Additional Information: stated that she is needing a call back from the nurse about pt sx clearance and cardiology info. Please advise

## 2024-05-09 NOTE — ASSESSMENT & PLAN NOTE
Component Ref Range & Units 8 d ago  (5/1/24) 8 d ago  (5/1/24) 1 yr ago  (4/27/23) 4 yr ago  (1/8/20) 4 yr ago  (1/8/20) 5 yr ago  (4/10/19) 5 yr ago  (4/10/19)   WBC x10(3)/mcL 17 17.00 High  R 8.6 R  12.5 High  R 8.8 R    Neutrophils % % 48         Lymphs % % 46         Monocytes % % 4  8.7 10   8   Eosinophils % % 2  3.4 1   2   Metamyelocytes % <=0 % 1 High          Neutrophils Abs 2.1 - 9.2 x10(3)/mcL 8.16    9.37 High  R 5.86 R    Lymphs Abs 0.6 - 4.6 x10(3)/mcL 7.82 High   3.74 1.7   2.0   Monocytes Abs 0.1 - 1.3 x10(3)/mcL 0.68  0.75 1.2   0.7   Eosinophils Abs 0 - 0.9 x10(3)/mcL 0.34  0.29 0.1 R   0.2 R   Platelets Normal, Adequate Normal         RBC Morph Normal Normal                     Component Ref Range & Units 8 d ago 1 yr ago 4 yr ago 5 yr ago   WBC 4.50 - 11.50 x10(3)/mcL 17.00 High  8.6 R 12.5 High  R 8.8 R   RBC 4.70 - 6.10 x10(6)/mcL 4.72 4.88 4.84 5.23   Hgb 14.0 - 18.0 g/dL 15.4 16.1 15.5 R 16.8 R   Hct 42.0 - 52.0 % 45.6 47.2 45.0 49.4   MCV 80.0 - 94.0 fL 96.6 High  96.7 High  93.0 94.5 High    MCH 27.0 - 31.0 pg 32.6 High  33.0 High  32.0 High  32.1 High    MCHC 33.0 - 36.0 g/dL 33.8 34.1 34.4 R 34.0 R   RDW 11.5 - 17.0 % 13.2 13.6 12.5 13.4   Platelet 130 - 400 x10(3)/mcL 210 205 220 219   MPV 7.4 - 10.4 fL 10.6 High  11.3 High  9.7 R 11.7 R   NRBC% % 0.0

## 2024-05-13 LAB — MAYO GENERIC ORDERABLE RESULT: NORMAL

## 2024-05-14 ENCOUNTER — TELEPHONE (OUTPATIENT)
Dept: PRIMARY CARE CLINIC | Facility: CLINIC | Age: 79
End: 2024-05-14
Payer: MEDICARE

## 2024-05-14 DIAGNOSIS — C91.10 CLL (CHRONIC LYMPHOCYTIC LEUKEMIA): Primary | Chronic | ICD-10-CM

## 2024-05-14 NOTE — TELEPHONE ENCOUNTER
Spoke to patient, flow cytometry testing showed CLL, we will place a referral to hematology/oncology for an evaluation.  Patient is still awaiting cardiology clearance for his sinus surgery, more than likely he will have to see Heme-Onc before being completely cleared for his surgery.

## 2024-05-22 ENCOUNTER — TELEPHONE (OUTPATIENT)
Dept: PRIMARY CARE CLINIC | Facility: CLINIC | Age: 79
End: 2024-05-22
Payer: MEDICARE

## 2024-05-22 NOTE — TELEPHONE ENCOUNTER
----- Message from Dede Varner LPN sent at 5/22/2024  3:57 PM CDT -----  Regarding: FW: update  Please contact to inform patient that his referral was recent today to silke Molina  ----- Message -----  From: Emilie Hansen  Sent: 5/22/2024  10:55 AM CDT  To: [facility] Staff  Subject: update                                           .Type:  Patient Requesting Referral    Who Called:pt   Does the patient already have the specialty appointment scheduled?:  If yes, what is the date of that appointment?:  Referral to What Specialty:  Reason for Referral: [id number] - AMB REFERRAL/CONSULT TO HEMATOLOGY / ONCOLOGY  Does the patient want the referral with a specific physician?:  Is the specialist an [facility]?:  Patient Requesting a Response?:yes  Would the patient rather a call back or a response via [facility]?   Best Call Back Number:[phone]   Additional Information: needs update on referral

## 2024-06-02 NOTE — PROGRESS NOTES
Referring physician: Dr. Mick Lorenzana  Reason for referral: CLL      Subjective:       Patient ID: Javier De Santiago is a 78 y.o. male.    Chronic Lymphocytic Leukemia--Diagnosed 5/9/24  Work-up:  Flow cytometry on peripheral blood done 5/9/24--findings c/w CLL, kappa light chain restricted.    Current treatment: Observation    Chief Complaint: Fatigue    HPI    77 yo male found on routine labs to have a mild leukocytosis, c/w lymphocytosis. Flow cytometry done showed CLL. This was a new finding from annual labs done in 5/2024. WBC was normal in 4/2023.   H/H and platelets normal. Patient is very healthy for his age, only takes medications for seasonal allergies. Patient presents for initial clinic visit with his wife. His only complaint is severe fatigue. No night sweats, weight loss or fevers. He is a former cigarette smoker and now smokes a pipe. He reports having chronic sinus issues for years and is being scheduled for a sinus surgery. But he had to get cardiac clearance prior to surgery. He is scheduled for stress test on 6/20/24.     Past Medical History:   Diagnosis Date    Class 1 obesity due to excess calories with serious comorbidity and body mass index (BMI) of 30.0 to 30.9 in adult 1/22/2023    Fatigue     H/O colectomy     H/O ileostomy     Hyperlipidemia       Past Surgical History:   Procedure Laterality Date    COLECTOMY      CYSTO, RETROGRADE PYELOGRAM,BALLOON DILATION, STENT PLACEMENT  01/24/2020    ILEOSTOMY      PHACOEMULSIFICATION, CATARACT, WITH IOL INSERTION Left 2/9/2024    Procedure: PHACOEMULSIFICATION, CATARACT, WITH IOL INSERTION- OS;  Surgeon: Ousmane Cho MD;  Location: [facility] OR;  Service: Ophthalmology;  Laterality: Left;    PHACOEMULSIFICATION, CATARACT, WITH IOL INSERTION Right 3/19/2024    Procedure: PHACOEMULSIFICATION, CATARACT, WITH IOL INSERTION- OD;  Surgeon: Ousmane Cho MD;  Location: [facility] OR;  Service: Ophthalmology;  Laterality: Right;    TONSILLECTOMY       Family  "History   Problem Relation Name Age of Onset    Dementia Mother      Heart failure Father       Social History     Socioeconomic History    Marital status:    Tobacco Use    Smoking status: Every Day     Types: Pipe    Smokeless tobacco: Never    Tobacco comments:     A pipe a day keeps the ...   Substance and Sexual Activity    Drug use: Never       Review of patient's allergies indicates:  No Known Allergies   Current Outpatient Medications on File Prior to Visit   Medication Sig Dispense Refill    fexofenadine (ALLEGRA) 180 MG tablet Take 180 mg by mouth once daily.      fluticasone propionate (FLONASE) 50 mcg/actuation nasal spray 2 sprays by Each Nostril route once daily.      potassium citrate (UROCIT-K) 10 mEq (1,080 mg) TbSR Take 10 mEq by mouth 3 (three) times daily.       No current facility-administered medications on file prior to visit.      Review of Systems   Constitutional:  Positive for fatigue. Negative for appetite change and unexpected weight change.   HENT:  Negative for mouth sores.    Eyes:  Negative for visual disturbance.   Respiratory:  Negative for cough and shortness of breath.    Cardiovascular:  Negative for chest pain.   Gastrointestinal:  Negative for abdominal pain and diarrhea.   Genitourinary:  Negative for frequency.   Musculoskeletal:  Negative for back pain.   Integumentary:  Negative for rash.   Neurological:  Negative for headaches.   Hematological:  Negative for adenopathy.   Psychiatric/Behavioral:  The patient is not nervous/anxious.               Vitals:    06/05/24 1323   BP: (!) 152/78   Pulse: (!) 55   Resp: 14   Temp: 97.8 °F (36.6 °C)   TempSrc: Oral   SpO2: 100%   Weight: 83.8 kg (184 lb 11.2 oz)   Height: 5' 6" (1.676 m)      Physical Exam  Constitutional:       General: He is awake.      Appearance: Normal appearance.   HENT:      Head: Normocephalic and atraumatic.      Nose: Nose normal.      Mouth/Throat:      Mouth: Mucous membranes are moist.   Eyes:    "   General: Vision grossly intact.      Extraocular Movements: Extraocular movements intact.      Conjunctiva/sclera: Conjunctivae normal.   Cardiovascular:      Rate and Rhythm: Normal rate and regular rhythm.      Heart sounds: Normal heart sounds.   Pulmonary:      Effort: Pulmonary effort is normal.      Comments: Decreased breath sounds bilaterally  Abdominal:      General: Bowel sounds are normal. There is no distension.      Palpations: Abdomen is soft.      Tenderness: There is no abdominal tenderness.   Musculoskeletal:      Cervical back: Normal range of motion and neck supple.      Right lower leg: No edema.      Left lower leg: No edema.   Lymphadenopathy:      Cervical: No cervical adenopathy.      Upper Body:      Right upper body: No supraclavicular adenopathy.      Left upper body: No supraclavicular adenopathy.      Comments: Shotty palpable lymph nodes bilateral axillae   Skin:     General: Skin is warm.   Neurological:      Mental Status: He is alert and oriented to person, place, and time.      Motor: Motor function is intact.   Psychiatric:         Mood and Affect: Mood normal.         Speech: Speech normal.         Behavior: Behavior is cooperative.         Judgment: Judgment normal.       Office Visit on 05/09/2024   Component Date Value    [place] Generic Orderable 05/09/2024 SEE COMMENTS    Lab Visit on 05/01/2024   Component Date Value    Sodium 05/01/2024 140     Potassium 05/01/2024 4.3     Chloride 05/01/2024 105     CO2 05/01/2024 26     Glucose 05/01/2024 103     Blood Urea Nitrogen 05/01/2024 17.2     Creatinine 05/01/2024 1.07     Calcium 05/01/2024 9.9     Protein Total 05/01/2024 7.2     Albumin 05/01/2024 4.0     Globulin 05/01/2024 3.2     Albumin/Globulin Ratio 05/01/2024 1.3     Bilirubin Total 05/01/2024 0.6     ALP 05/01/2024 58     ALT 05/01/2024 21     AST 05/01/2024 23     eGFR 05/01/2024 >60     PTT 05/01/2024 26.4     PT 05/01/2024 13.2     INR 05/01/2024 1.0     WBC  05/01/2024 17.00 (H)     RBC 05/01/2024 4.72     Hgb 05/01/2024 15.4     Hct 05/01/2024 45.6     MCV 05/01/2024 96.6 (H)     MCH 05/01/2024 32.6 (H)     MCHC 05/01/2024 33.8     RDW 05/01/2024 13.2     Platelet 05/01/2024 210     MPV 05/01/2024 10.6 (H)     NRBC% 05/01/2024 0.0     Cholesterol Total 05/01/2024 197     HDL Cholesterol 05/01/2024 47     Triglyceride 05/01/2024 238 (H)     Cholesterol/HDL Ratio 05/01/2024 4     Very Low Density Lipopro* 05/01/2024 48     LDL Cholesterol 05/01/2024 102.00     Hemoglobin A1c 05/01/2024 5.5     Estimated Average Glucose 05/01/2024 111.2     TSH 05/01/2024 1.235     Vitamin D 05/01/2024 42.5     Prostate Specific Antigen 05/01/2024 0.92     WBC 05/01/2024 17     Neutrophils % 05/01/2024 48     Lymphs % 05/01/2024 46     Monocytes % 05/01/2024 4     Eosinophils % 05/01/2024 2     Metamyelocytes % 05/01/2024 1 (H)     Neutrophils Abs 05/01/2024 8.16     Lymphs Abs 05/01/2024 7.82 (H)     Monocytes Abs 05/01/2024 0.68     Eosinophils Abs 05/01/2024 0.34     Platelets 05/01/2024 Normal     RBC Morph 05/01/2024 Normal     QRS Duration 05/01/2024 112     OHS QTC Calculation 05/01/2024 449                   Assessment:       1. CLL (chronic lymphocytic leukemia)    2. Genetic anomalies of leukocytes         Plan:       Patient with CLL, newly diagnosed.  WBC mildly elevated, normal H/H and normal platelets.  Patient has fatigue, but doubt from disease as he has no anemia or thrombocytopenia or other B-symptoms.     No clear indications for any treatment at this time.  Will check LDH, beta-2 microglobulin and FISH CLL panel.    Plan for observation only for now.  F/u in 3 months with repeat labs.    Discussed diagnosis of CLL and usual course of treatment including observation.   Patient given patient information from Up to date on CLL.    All questions answered at this time.    Sherry Watson MD

## 2024-06-05 ENCOUNTER — OFFICE VISIT (OUTPATIENT)
Dept: HEMATOLOGY/ONCOLOGY | Facility: CLINIC | Age: 79
End: 2024-06-05
Payer: MEDICARE

## 2024-06-05 VITALS
DIASTOLIC BLOOD PRESSURE: 78 MMHG | WEIGHT: 184.69 LBS | TEMPERATURE: 98 F | OXYGEN SATURATION: 100 % | RESPIRATION RATE: 14 BRPM | BODY MASS INDEX: 29.68 KG/M2 | HEIGHT: 66 IN | HEART RATE: 55 BPM | SYSTOLIC BLOOD PRESSURE: 152 MMHG

## 2024-06-05 DIAGNOSIS — D72.0 GENETIC ANOMALIES OF LEUKOCYTES: ICD-10-CM

## 2024-06-05 DIAGNOSIS — C91.10 CLL (CHRONIC LYMPHOCYTIC LEUKEMIA): ICD-10-CM

## 2024-06-05 LAB
ALBUMIN SERPL-MCNC: 3.9 G/DL (ref 3.4–4.8)
ALBUMIN/GLOB SERPL: 1.3 RATIO (ref 1.1–2)
ALP SERPL-CCNC: 58 UNIT/L (ref 40–150)
ALT SERPL-CCNC: 21 UNIT/L (ref 0–55)
ANION GAP SERPL CALC-SCNC: 7 MEQ/L
AST SERPL-CCNC: 22 UNIT/L (ref 5–34)
B2 MICROGLOB SERPL-MCNC: 2.73 MG/L (ref 0.97–2.64)
BASOPHILS # BLD AUTO: 0.03 X10(3)/MCL
BASOPHILS NFR BLD AUTO: 0.2 %
BILIRUB SERPL-MCNC: 0.6 MG/DL
BUN SERPL-MCNC: 16.4 MG/DL (ref 8.4–25.7)
CALCIUM SERPL-MCNC: 10 MG/DL (ref 8.8–10)
CHLORIDE SERPL-SCNC: 104 MMOL/L (ref 98–107)
CO2 SERPL-SCNC: 31 MMOL/L (ref 23–31)
CREAT SERPL-MCNC: 1.12 MG/DL (ref 0.73–1.18)
CREAT/UREA NIT SERPL: 15
EOSINOPHIL # BLD AUTO: 0.15 X10(3)/MCL (ref 0–0.9)
EOSINOPHIL NFR BLD AUTO: 0.8 %
ERYTHROCYTE [DISTWIDTH] IN BLOOD BY AUTOMATED COUNT: 13.3 % (ref 11.5–17)
GFR SERPLBLD CREATININE-BSD FMLA CKD-EPI: >60 ML/MIN/1.73/M2
GLOBULIN SER-MCNC: 3.1 GM/DL (ref 2.4–3.5)
GLUCOSE SERPL-MCNC: 94 MG/DL (ref 82–115)
HCT VFR BLD AUTO: 45.9 % (ref 42–52)
HGB BLD-MCNC: 15.7 G/DL (ref 14–18)
IGA SERPL-MCNC: 187 MG/DL (ref 101–645)
IGG SERPL-MCNC: 1055 MG/DL (ref 540–1822)
IGM SERPL-MCNC: 85 MG/DL (ref 22–240)
IMM GRANULOCYTES # BLD AUTO: 0.04 X10(3)/MCL (ref 0–0.04)
IMM GRANULOCYTES NFR BLD AUTO: 0.2 %
LDH SERPL-CCNC: 197 U/L (ref 125–220)
LYMPHOCYTES # BLD AUTO: 11.11 X10(3)/MCL (ref 0.6–4.6)
LYMPHOCYTES NFR BLD AUTO: 59.3 %
MCH RBC QN AUTO: 33.1 PG (ref 27–31)
MCHC RBC AUTO-ENTMCNC: 34.2 G/DL (ref 33–36)
MCV RBC AUTO: 96.6 FL (ref 80–94)
MONOCYTES # BLD AUTO: 1.38 X10(3)/MCL (ref 0.1–1.3)
MONOCYTES NFR BLD AUTO: 7.4 %
NEUTROPHILS # BLD AUTO: 6.02 X10(3)/MCL (ref 2.1–9.2)
NEUTROPHILS NFR BLD AUTO: 32.1 %
PLATELET # BLD AUTO: 188 X10(3)/MCL (ref 130–400)
PMV BLD AUTO: 9.7 FL (ref 7.4–10.4)
POTASSIUM SERPL-SCNC: 4.4 MMOL/L (ref 3.5–5.1)
PROT SERPL-MCNC: 7 GM/DL (ref 5.8–7.6)
RBC # BLD AUTO: 4.75 X10(6)/MCL (ref 4.7–6.1)
SODIUM SERPL-SCNC: 142 MMOL/L (ref 136–145)
WBC # SPEC AUTO: 18.73 X10(3)/MCL (ref 4.5–11.5)

## 2024-06-05 PROCEDURE — 3288F FALL RISK ASSESSMENT DOCD: CPT | Mod: CPTII,S$GLB,, | Performed by: INTERNAL MEDICINE

## 2024-06-05 PROCEDURE — 1125F AMNT PAIN NOTED PAIN PRSNT: CPT | Mod: CPTII,S$GLB,, | Performed by: INTERNAL MEDICINE

## 2024-06-05 PROCEDURE — 83615 LACTATE (LD) (LDH) ENZYME: CPT | Performed by: INTERNAL MEDICINE

## 2024-06-05 PROCEDURE — 1159F MED LIST DOCD IN RCRD: CPT | Mod: CPTII,S$GLB,, | Performed by: INTERNAL MEDICINE

## 2024-06-05 PROCEDURE — 3077F SYST BP >= 140 MM HG: CPT | Mod: CPTII,S$GLB,, | Performed by: INTERNAL MEDICINE

## 2024-06-05 PROCEDURE — 99204 OFFICE O/P NEW MOD 45 MIN: CPT | Mod: S$GLB,,, | Performed by: INTERNAL MEDICINE

## 2024-06-05 PROCEDURE — 88275 CYTOGENETICS 100-300: CPT | Performed by: INTERNAL MEDICINE

## 2024-06-05 PROCEDURE — 86146 BETA-2 GLYCOPROTEIN ANTIBODY: CPT | Performed by: INTERNAL MEDICINE

## 2024-06-05 PROCEDURE — 99999 PR PBB SHADOW E&M-EST. PATIENT-LVL IV: CPT | Mod: PBBFAC,,, | Performed by: INTERNAL MEDICINE

## 2024-06-05 PROCEDURE — 3078F DIAST BP <80 MM HG: CPT | Mod: CPTII,S$GLB,, | Performed by: INTERNAL MEDICINE

## 2024-06-05 PROCEDURE — 1160F RVW MEDS BY RX/DR IN RCRD: CPT | Mod: CPTII,S$GLB,, | Performed by: INTERNAL MEDICINE

## 2024-06-05 PROCEDURE — 36415 COLL VENOUS BLD VENIPUNCTURE: CPT | Performed by: INTERNAL MEDICINE

## 2024-06-05 PROCEDURE — 1101F PT FALLS ASSESS-DOCD LE1/YR: CPT | Mod: CPTII,S$GLB,, | Performed by: INTERNAL MEDICINE

## 2024-06-05 PROCEDURE — 85025 COMPLETE CBC W/AUTO DIFF WBC: CPT | Performed by: INTERNAL MEDICINE

## 2024-06-05 PROCEDURE — 80053 COMPREHEN METABOLIC PANEL: CPT | Performed by: INTERNAL MEDICINE

## 2024-06-05 PROCEDURE — 82784 ASSAY IGA/IGD/IGG/IGM EACH: CPT | Performed by: INTERNAL MEDICINE

## 2024-06-11 LAB
CELLS W CYTOGENETIC ABNL BLD/T: NORMAL
CHROM ANALY RESULT (ISCN): NORMAL
CLINICAL CYTOGENETICIST REVIEW: NORMAL
LAB TEST METHOD: NORMAL
M REASON FOR REFERRAL: NORMAL
MOL DX INTERP BLD/T QL: NORMAL
PROVIDER SIGNING NAME: NORMAL
SPECIMEN SOURCE: NORMAL
SPECIMEN SOURCE: NORMAL
TEST PERFORMANCE INFO SPEC: NORMAL

## 2024-06-21 ENCOUNTER — TELEPHONE (OUTPATIENT)
Dept: HEMATOLOGY/ONCOLOGY | Facility: CLINIC | Age: 79
End: 2024-06-21
Payer: MEDICARE

## 2024-06-21 NOTE — TELEPHONE ENCOUNTER
Called pt and notified of the following, Dr. Watson reviews all the labs that she orders. The findings are consistent with his diagnosis of CLL. His WBC is elevated and is from the CLL and we are monitoring. Otherwise, labs are good.   Pt is concerned about the ABN results of the CLL FISH and wants to know if his appt needs to be moved up.

## 2024-06-21 NOTE — TELEPHONE ENCOUNTER
Pt's wife called with concern that some of pt's labs were ABN and states that portal indicated that provider had not reviewed and next appt isn't until September. Please advise.

## 2024-06-21 NOTE — TELEPHONE ENCOUNTER
Dr. Watson reviews all the labs that she orders. The findings are consistent with his diagnosis of CLL. His WBC is elevated and is from the CLL and we are monitoring. Otherwise, labs are good.

## 2024-06-21 NOTE — TELEPHONE ENCOUNTER
LM on pt's VM that although his labs may say ABN, they are normal for his diagnosis and that there is no need to move appt up.

## 2024-06-25 ENCOUNTER — TELEPHONE (OUTPATIENT)
Dept: PRIMARY CARE CLINIC | Facility: CLINIC | Age: 79
End: 2024-06-25
Payer: MEDICARE

## 2024-06-25 NOTE — TELEPHONE ENCOUNTER
----- Message from Adrián Nuñez sent at 6/25/2024 12:32 PM CDT -----  Regarding: Clearence  Who Called: Jocelyne With [facility]    Patient is returning phone call    Who Left Message for Patient:  Does the patient know what this is regarding?:      Preferred Method of Contact: Phone Call  Patient's Preferred Phone Number on File: [phone]   Best Call Back Number, if different:[phone] Jocelyne  Additional Information: Calling regarding surgery clearance. Jocelyne stated pt has been cleared by Cardiology. Dev fax: [phone]

## 2024-06-27 ENCOUNTER — TELEPHONE (OUTPATIENT)
Dept: PRIMARY CARE CLINIC | Facility: CLINIC | Age: 79
End: 2024-06-27
Payer: MEDICARE

## 2024-06-27 NOTE — TELEPHONE ENCOUNTER
Called [facility] to see if they received the Cardiology clearance from . They stated they did not receive clearance. She will be faxing over what she has for the pt over to me.

## 2024-06-27 NOTE — TELEPHONE ENCOUNTER
Contact [facility] and they stated pt was cleared by  for Sx Clearance. She will refax Sx Clearance to me so  can sign off.

## 2024-08-12 ENCOUNTER — HOSPITAL ENCOUNTER (INPATIENT)
Facility: HOSPITAL | Age: 79
LOS: 1 days | Discharge: HOME | DRG: 309 | End: 2024-08-13
Attending: EMERGENCY MEDICINE | Admitting: INTERNAL MEDICINE
Payer: MEDICARE

## 2024-08-12 DIAGNOSIS — I48.92 ATRIAL FLUTTER, UNSPECIFIED TYPE: Primary | ICD-10-CM

## 2024-08-12 DIAGNOSIS — I25.10 CAD (CORONARY ARTERY DISEASE): ICD-10-CM

## 2024-08-12 DIAGNOSIS — R53.1 GENERALIZED WEAKNESS: ICD-10-CM

## 2024-08-12 LAB
ABS NEUT (OLG): 6.67 X10(3)/MCL (ref 2.1–9.2)
ALBUMIN SERPL-MCNC: 4 G/DL (ref 3.4–4.8)
ALBUMIN/GLOB SERPL: 1.2 RATIO (ref 1.1–2)
ALP SERPL-CCNC: 58 UNIT/L (ref 40–150)
ALT SERPL-CCNC: 22 UNIT/L (ref 0–55)
ANION GAP SERPL CALC-SCNC: 10 MEQ/L
APTT PPP: 25.8 SECONDS (ref 23.2–33.7)
AST SERPL-CCNC: 24 UNIT/L (ref 5–34)
BILIRUB SERPL-MCNC: 0.8 MG/DL
BUN SERPL-MCNC: 15.7 MG/DL (ref 8.4–25.7)
CALCIUM SERPL-MCNC: 9.8 MG/DL (ref 8.8–10)
CHLORIDE SERPL-SCNC: 102 MMOL/L (ref 98–107)
CO2 SERPL-SCNC: 25 MMOL/L (ref 23–31)
CREAT SERPL-MCNC: 1.17 MG/DL (ref 0.73–1.18)
CREAT/UREA NIT SERPL: 13
EOSINOPHIL NFR BLD MANUAL: 0.27 X10(3)/MCL (ref 0–0.9)
EOSINOPHIL NFR BLD MANUAL: 1 %
ERYTHROCYTE [DISTWIDTH] IN BLOOD BY AUTOMATED COUNT: 13.9 % (ref 11.5–17)
GFR SERPLBLD CREATININE-BSD FMLA CKD-EPI: >60 ML/MIN/1.73/M2
GLOBULIN SER-MCNC: 3.3 GM/DL (ref 2.4–3.5)
GLUCOSE SERPL-MCNC: 116 MG/DL (ref 82–115)
HCT VFR BLD AUTO: 48.4 % (ref 42–52)
HGB BLD-MCNC: 16.1 G/DL (ref 14–18)
INR PPP: 1
INSTRUMENT WBC (OLG): 26.67 X10(3)/MCL
LYMPHOCYTES NFR BLD MANUAL: 19.47 X10(3)/MCL
LYMPHOCYTES NFR BLD MANUAL: 73 %
MAGNESIUM SERPL-MCNC: 1.9 MG/DL (ref 1.6–2.6)
MCH RBC QN AUTO: 32.3 PG (ref 27–31)
MCHC RBC AUTO-ENTMCNC: 33.3 G/DL (ref 33–36)
MCV RBC AUTO: 97 FL (ref 80–94)
MONOCYTES NFR BLD MANUAL: 0.27 X10(3)/MCL (ref 0.1–1.3)
MONOCYTES NFR BLD MANUAL: 1 %
NEUTROPHILS NFR BLD MANUAL: 25 %
NRBC BLD AUTO-RTO: 0 %
PLATELET # BLD AUTO: 201 X10(3)/MCL (ref 130–400)
PLATELET # BLD EST: NORMAL 10*3/UL
PLATELETS.RETICULATED NFR BLD AUTO: 3.6 % (ref 0.9–11.2)
PMV BLD AUTO: 10.6 FL (ref 7.4–10.4)
POTASSIUM SERPL-SCNC: 4.2 MMOL/L (ref 3.5–5.1)
PROT SERPL-MCNC: 7.3 GM/DL (ref 5.8–7.6)
PROTHROMBIN TIME: 13.6 SECONDS (ref 12.5–14.5)
RBC # BLD AUTO: 4.99 X10(6)/MCL (ref 4.7–6.1)
RBC MORPH BLD: NORMAL
SODIUM SERPL-SCNC: 137 MMOL/L (ref 136–145)
TROPONIN I SERPL-MCNC: 0.01 NG/ML (ref 0–0.04)
TSH SERPL-ACNC: 1.71 UIU/ML (ref 0.35–4.94)
WBC # BLD AUTO: 26.67 X10(3)/MCL (ref 4.5–11.5)

## 2024-08-12 PROCEDURE — 80053 COMPREHEN METABOLIC PANEL: CPT

## 2024-08-12 PROCEDURE — 85730 THROMBOPLASTIN TIME PARTIAL: CPT

## 2024-08-12 PROCEDURE — 85610 PROTHROMBIN TIME: CPT

## 2024-08-12 PROCEDURE — 21400001 HC TELEMETRY ROOM

## 2024-08-12 PROCEDURE — 84443 ASSAY THYROID STIM HORMONE: CPT

## 2024-08-12 PROCEDURE — 85027 COMPLETE CBC AUTOMATED: CPT

## 2024-08-12 PROCEDURE — 99285 EMERGENCY DEPT VISIT HI MDM: CPT | Mod: 25

## 2024-08-12 PROCEDURE — 25000003 PHARM REV CODE 250: Performed by: EMERGENCY MEDICINE

## 2024-08-12 PROCEDURE — 84484 ASSAY OF TROPONIN QUANT: CPT

## 2024-08-12 PROCEDURE — 85007 BL SMEAR W/DIFF WBC COUNT: CPT

## 2024-08-12 PROCEDURE — 83735 ASSAY OF MAGNESIUM: CPT

## 2024-08-12 PROCEDURE — 93005 ELECTROCARDIOGRAM TRACING: CPT

## 2024-08-12 PROCEDURE — 93010 ELECTROCARDIOGRAM REPORT: CPT | Mod: ,,, | Performed by: INTERNAL MEDICINE

## 2024-08-12 PROCEDURE — 11000001 HC ACUTE MED/SURG PRIVATE ROOM

## 2024-08-12 RX ORDER — KETAMINE HYDROCHLORIDE 10 MG/ML
INJECTION, SOLUTION INTRAMUSCULAR; INTRAVENOUS CODE/TRAUMA/SEDATION MEDICATION
Status: COMPLETED | OUTPATIENT
Start: 2024-08-12 | End: 2024-08-12

## 2024-08-12 NOTE — FIRST PROVIDER EVALUATION
"Medical screening examination initiated.  I have conducted a focused provider triage encounter, findings are as follows:    Brief history of present illness:  78 year old male presents to ER for low HR. Patient went for sinus surgery this morning and found to have low HR. Patient reports generalized weakness    Vitals:    08/12/24 1403   BP: (!) 156/73   BP Location: Left arm   Pulse: (!) 34   Resp: 19   Temp: 98.2 °F (36.8 °C)   TempSrc: Temporal   SpO2: 98%   Weight: 81.6 kg (180 lb)   Height: 5' 6" (1.676 m)       Pertinent physical exam:  Awake and alert, NAD    Brief workup plan:  Labs, EKG, CXR    Preliminary workup initiated; this workup will be continued and followed by the physician or advanced practice provider that is assigned to the patient when roomed.  " Post void bladder scan 0ml     Cathryn Salmeron, RN  01/01/22 [id number]

## 2024-08-12 NOTE — H&P
[facility] - Emergency Dept    Cardiology  History and Physical     Patient Name: Javier De Santiago  MRN: [id number]  Admission Date: 8/12/2024  Code Status: No Order   Attending Provider: Vince Watts MD   Primary Care Physician: Mick Lorenzana MD  Principal Problem:<principal problem not specified>    Patient information was obtained from patient, past medical records, ER records, and primary team.     Subjective:     Chief Complaint:  Afib SVR    HPI: Mr. De Santiago is a 79 y/o that is known to [facility], Dr. Vincent. He was previously cleared for a Sinus Surgery. The patient presented for the procedure this AM (8.12.2024) and was given Versed and became bradycardiac. He was transferred to the ER where he was found to be bradycardiac SVR. He will be admitted and evaluated by [facility].    PMH: Heart Murmur, Hyperlipidemia, Obese  PSH: Tonsillectomy, History of Ileostomy, History of Colectomy, Cysto-Retrograde pyelogram, balloon dilation, stent placement.  Family History: Father- Systolic CHF; Mother - Dementia  Social History: Current smoker (tobacco and pipe), ETOH (social), denies Illicit drug use    Previous Cardiac Diagnostics:   PET- 6.20.24  This is a normal perfusion study, no perfusion defects noted. There is no evidence of ischemia.   Very small inferoapical artifact.  This scan is suggestive of low risk for future cardiovascular events.   The left ventricular cavity is noted to be normal on the stress studies. The stress left ventricular ejection fraction was calculated to be 69% and left ventricular global function is normal. The rest left ventricular cavity is noted to be normal. The rest left ventricular ejection fraction was calculated to be 60% and rest left ventricular global function is normal.   When compared to the resting ejection fraction (60%), the stress ejection fraction (69%) has increased.   The study quality is excellent.   There was a rise in myocardial blood flow between rest and  stress.  Global myocardial blood flow reserve was 2.52.  Myocardial blood flow reserve is reduced in the apical territory which is suggestive of flow limiting stenosis in this territory.     Inter-modality Comparison Vs Nuclear Scan 6.5.24  Ejection fraction essentially remained unchanged (65% previous study, 60% current study).     ECHO 6.20.24  The study quality is average.   The left ventricle is normal in size. Global left ventricular systolic function is normal. The left ventricular ejection fraction is 60%. The left ventricle diastolic function is impaired (Grade I) with normal left atrial pressure. Mild asymmetric basal left ventricular hypertrophy is present.   The left atrial diameter is mildly increased. (4.1 cm). The left atrium is normal in size based on the left atrium volume index of 30.0ml/m².  Mild (1+) mitral regurgitation. Mild mitral annular calcification is noted.  Mild (1+) aortic regurgitation. Mild calcification of the aortic valve is noted with adequate cuspal excursion.  The pulmonary artery systolic pressure is 28 mmHg.     Intra-modality comparison (ECHO) 6.5.17    Ejection fraction essentially remained unchanged (65% previous study, 60% current study).   Aortic root diameter remains unchanged (3.5 cm).         Review of patient's allergies indicates:  No Known Allergies    No current facility-administered medications on file prior to encounter.     Current Outpatient Medications on File Prior to Encounter   Medication Sig    fexofenadine (ALLEGRA) 180 MG tablet Take 180 mg by mouth once daily.    fluticasone propionate (FLONASE) 50 mcg/actuation nasal spray 2 sprays by Each Nostril route once daily.    potassium citrate (UROCIT-K) 10 mEq (1,080 mg) TbSR Take 10 mEq by mouth 3 (three) times daily.       Review of Systems   Constitutional: Positive for malaise/fatigue.   Cardiovascular:  Negative for dyspnea on exertion and syncope.   Respiratory:  Negative for shortness of breath.    All  "other systems reviewed and are negative.    Objective:     Vital Signs (Most Recent):  Temp: 98.2 °F (36.8 °C) (24 1403)  Pulse: (!) 35 (24 1502)  Resp: 17 (24 1502)  BP: (!) 154/68 (24 1502)  SpO2: 95 % (24 1502) Vital Signs (24h Range):  Temp:  [98.2 °F (36.8 °C)] 98.2 °F (36.8 °C)  Pulse:  [34-35] 35  Resp:  [17-19] 17  SpO2:  [95 %-98 %] 95 %  BP: (154-156)/(68-73) 154/68     Weight: 81.6 kg (180 lb)  Body mass index is 29.05 kg/m².    SpO2: 95 %       No intake or output data in the 24 hours ending 24 1526    Lines/Drains/Airways       Peripheral Intravenous Line  Duration                  Peripheral IV - Single Lumen 24 1456 18 G Right Antecubital <1 day                    Physical Exam  Vitals and nursing note reviewed.   Constitutional:       Appearance: Normal appearance. He is obese.   HENT:      Head: Normocephalic.   Cardiovascular:      Rate and Rhythm: Bradycardia present. Rhythm irregular.   Pulmonary:      Effort: Pulmonary effort is normal.      Breath sounds: Normal breath sounds.   Skin:     General: Skin is warm and dry.   Neurological:      General: No focal deficit present.      Mental Status: He is alert and oriented to person, place, and time.         [id number]    Telemetry: Afib [facility]    Home Medications:   No current facility-administered medications on file prior to encounter.     Current Outpatient Medications on File Prior to Encounter   Medication Sig Dispense Refill    fexofenadine (ALLEGRA) 180 MG tablet Take 180 mg by mouth once daily.      fluticasone propionate (FLONASE) 50 mcg/actuation nasal spray 2 sprays by Each Nostril route once daily.      potassium citrate (UROCIT-K) 10 mEq (1,080 mg) TbSR Take 10 mEq by mouth 3 (three) times daily.       Current Schedule Inpatient Medications:    Continuous Infusions:    Significant Labs:   Chemistries:   No results for input(s): "NA", "K", "CL", "CO2", "BUN", "CREATININE", "CALCIUM", "PROT", " ""BILITOT", "ALKPHOS", "ALT", "AST", "GLUCOSE", "MG", "PHOS", "TROPONINI" in the last 168 hours.    Invalid input(s): "LABALBU"     CBC/Anemia Labs: Coags:    Recent Labs   Lab 08/12/24  1441   WBC 26.67*   HGB 16.1   HCT 48.4      MCV 97.0*   RDW 13.9    Recent Labs   Lab 08/12/24  1451   APTT 25.8          Assessment and Plan:   Afib SVR    - TSH Normal    - ECHO (6.20.2024) EF 60%    - PET (6.20.2024) No evidence of ischemia  Leukocytosis  Hypertension  No History of GI Bleed    Plan:   Hold all AV shelby blocking medication and rate blocking medications  Keep NPO after midnight  Consult EP in the morning for FELY/DCCV vs EP study/Ablation  Keep Potassium > 4 and Mg > 2  Labs and EKG in the morning (CBC, CMP, Mg).        Charlie Laureano, ANP  Cardiology  [facility] - Emergency Dept  08/12/2024 3:26 PM   "

## 2024-08-12 NOTE — Clinical Note
Diagnosis: Atrial flutter, unspecified type [8807582]   Future Attending Provider: PRANAY FITZGERALD [[id number]]   Admit to which facility:: [facility] [[id number]]   Reason for IP Medical Treatment  (Clinical interventions that can only be accomplished in the IP setting? ) :: ablation, tele monitoring   Plans for Post-Acute care--if anticipated (pick the single best option):: A. No post acute care anticipated at this time

## 2024-08-12 NOTE — ED PROVIDER NOTES
Encounter Date: 8/12/2024       History     Chief Complaint   Patient presents with    Bradycardia     POV. Ambulatory in triage. Patient went to sinus surgery this AM and found his HR to be in the 30s. Denies being on any medications for heart rate. C/o generalized weakness. Cardiologist is Carlton.     78-year-old male presents to the emergency department for evaluation of bradycardia noted while in preop for a sinus surgery.  Denies any history of known arrhythmia.  Received Versed ahead of the procedure, when connected for cardiac monitoring was noted to be bradycardic.  Normotensive.  Reports feels a little sleepy at this time but otherwise asymptomatic.    The history is provided by the patient.     Review of patient's allergies indicates:  No Known Allergies  Past Medical History:   Diagnosis Date    Class 1 obesity due to excess calories with serious comorbidity and body mass index (BMI) of 30.0 to 30.9 in adult 1/22/2023    Fatigue     H/O colectomy     H/O ileostomy     Hyperlipidemia      Past Surgical History:   Procedure Laterality Date    COLECTOMY      CYSTO, RETROGRADE PYELOGRAM,BALLOON DILATION, STENT PLACEMENT  01/24/2020    ILEOSTOMY      PHACOEMULSIFICATION, CATARACT, WITH IOL INSERTION Left 2/9/2024    Procedure: PHACOEMULSIFICATION, CATARACT, WITH IOL INSERTION- OS;  Surgeon: Ousmane Cho MD;  Location: [facility] OR;  Service: Ophthalmology;  Laterality: Left;    PHACOEMULSIFICATION, CATARACT, WITH IOL INSERTION Right 3/19/2024    Procedure: PHACOEMULSIFICATION, CATARACT, WITH IOL INSERTION- OD;  Surgeon: Ousmane Cho MD;  Location: [facility] OR;  Service: Ophthalmology;  Laterality: Right;    TONSILLECTOMY       Family History   Problem Relation Name Age of Onset    Dementia Mother      Heart failure Father       Social History     Tobacco Use    Smoking status: Every Day     Types: Pipe    Smokeless tobacco: Never    Tobacco comments:     A pipe a day keeps the ...   Substance Use Topics    Drug  This is the Subsequent Medicare Annual Wellness Exam, performed 12 months or more after the Initial AWV or the last Subsequent AWV    I have reviewed the patient's medical history in detail and updated the computerized patient record. Constipation: linzess works 75 but only itf adding metamucil     1. Anxiety  Increased anxiety due to recent diagnosis of daughter with bladder cancer. Has tried multiple different medications for sleep including Belsomra and trazodone. Takes 0.025 of Xanax at bedtime when having severe insomnia/ no refills done today     2. Essential hypertension  Blood pressure is controlled today on lisinopril 5 mg daily metoprolol 50 mg twice a day      3. Spondylolysis of lumbar region  Patient has chronic back pain wears brace. She takes tramadol 50 mg twice a day with improvement in quality of life. 4. Primary insomnia  Declined sleep referral.    5. Stage 3a chronic kidney disease  This is followed by [facility]    6. Acquired hypothyroidism  Currently having her constipation we will check TSH and continue Synthroid 75 mcg daily  - METABOLIC PANEL, COMPREHENSIVE; Future  - TSH AND FREE T4; Future  - LIPID PANEL; Future  - TSH AND FREE T4  - LIPID PANEL  - METABOLIC PANEL, COMPREHENSIVE    7. Esophageal dysphagia: History of hiatal hernia has had repair in 2004 now having dysphagia of solids  - XR BA SWALLOW ESOPHOGRAM; Future    8. Difficulty swallowing : barium swallow done and showed -Mild slower mechanism of swallowing no mass, overall results look good. Eat slowly, and puree foods that are harder to swallow. Sit up after eating for 1 hour    9.   History of coronary artery disease: Per cardiologist  Cath in 9/2019 with stable coronary disease; patent LAD stent placed previously, moderate 50% RCA stenosis, negative by FFR (0.85)  Continue with ASA, BB, statin  Living with oldest daughter - Lizzie     Assessment/Plan   Education and counseling provided:  Are appropriate based on today's review and evaluation    1. Medicare annual wellness visit, subsequent       Depression Risk Factor Screening     3 most recent PHQ Screens 7/2/2021   Little interest or pleasure in doing things Several days   Feeling down, depressed, irritable, or hopeless Several days   Total Score PHQ 2 2       Alcohol Risk Screen    Do you average more than 1 drink per night or more than 7 drinks a week:  No    On any one occasion in the past three months have you have had more than 3 drinks containing alcohol:  No        Functional Ability and Level of Safety    Hearing: Hearing is good. Activities of Daily Living: The home contains: grab bars  Patient needs help with:  preparing meals, laundry, housework, managing medications and managing money      Ambulation: with difficulty, uses a cane     Fall Risk:  Fall Risk Assessment, last 12 mths 7/2/2021   Able to walk? Yes   Fall in past 12 months? 0   Do you feel unsteady? 0   Are you worried about falling 0   Number of falls in past 12 months -   Fall with injury?  -      Abuse Screen:  Patient is not abused       Cognitive Screening    Has your family/caregiver stated any concerns about your memory: no     Cognitive Screening: Normal - Verbal Fluency Test    Health Maintenance Due     Health Maintenance Due   Topic Date Due    Shingrix Vaccine Age 49> (1 of 2) Never done       Patient Care Team   Patient Care Team:  Josefina Sellers MD as PCP - General (Internal Medicine)  Viky Waldron MD as PCP - DERMATOLOGY (Dermatology)  Josefina Sellers MD as PCP - [facility]  Carter Alejandro MD as Physician (Cardiology)  Carter Alejandro MD as Physician (Cardiology)  Rojelio Aguirre MD (Nephrology)    History     Patient Active Problem List   Diagnosis Code    Other and unspecified hyperlipidemia E78.5    Unspecified vitamin D deficiency E55.9    Hypothyroidism E03.9    ASHD (arteriosclerotic heart disease) I25.10    Hyperlipidemia E78.5 use: Never     Review of Systems   Constitutional:  Positive for fatigue (post Versed administration from preop). Negative for fever.   Respiratory:  Negative for chest tightness and shortness of breath.    Cardiovascular:  Negative for palpitations.       Physical Exam     Initial Vitals [08/12/24 1403]   BP Pulse Resp Temp SpO2   (!) 156/73 (!) 34 19 98.2 °F (36.8 °C) 98 %      MAP       --         Physical Exam    Nursing note and vitals reviewed.  Constitutional: He appears well-developed and well-nourished. No distress.   HENT:   Head: Normocephalic and atraumatic.   Mouth/Throat: Oropharynx is clear and moist.   Neck: No thyromegaly present.   Cardiovascular:  An irregularly irregular rhythm present.   Bradycardia present.         Pulmonary/Chest: No respiratory distress.     Neurological: He is alert and oriented to person, place, and time. GCS score is 15.   Skin: Skin is warm and dry.         ED Course   Procedures  Labs Reviewed   COMPREHENSIVE METABOLIC PANEL - Abnormal       Result Value    Sodium 137      Potassium 4.2      Chloride 102      CO2 25      Glucose 116 (*)     Blood Urea Nitrogen 15.7      Creatinine 1.17      Calcium 9.8      Protein Total 7.3      Albumin 4.0      Globulin 3.3      Albumin/Globulin Ratio 1.2      Bilirubin Total 0.8      ALP 58      ALT 22      AST 24      eGFR >60      Anion Gap 10.0      BUN/Creatinine Ratio 13     CBC WITH DIFFERENTIAL - Abnormal    WBC 26.67 (*)     RBC 4.99      Hgb 16.1      Hct 48.4      MCV 97.0 (*)     MCH 32.3 (*)     MCHC 33.3      RDW 13.9      Platelet 201      MPV 10.6 (*)     NRBC% 0.0      IPF 3.6     MANUAL DIFFERENTIAL - Abnormal    WBC 26.67      Neutrophils % 25      Lymphs % 73      Monocytes % 1      Eosinophils % 1      Neutrophils Abs 6.6675      Lymphs Abs 19.4691 (*)     Monocytes Abs 0.2667      Eosinophils Abs 0.2667      Platelets Normal      RBC Morph Normal     APTT - Normal    PTT 25.8     TROPONIN I - Normal    Troponin-I  0.013     MAGNESIUM - Normal    Magnesium Level 1.90     PROTIME-INR - Normal    PT 13.6      INR 1.0     TSH - Normal    TSH 1.714     CBC W/ AUTO DIFFERENTIAL    Narrative:     The following orders were created for panel order CBC auto differential.  Procedure                               Abnormality         Status                     ---------                               -----------         ------                     CBC with Differential[[id number]]       Abnormal            Final result               Manual Differential[[id number]]         Abnormal            Final result                 Please view results for these tests on the individual orders.     EKG Readings: (Independently Interpreted)   Initial Reading: No STEMI. Rhythm: Atrial Flutter. Heart Rate: 34. Clinical Impression: Atrial Flutter   08/12/2024 @ 1359       Imaging Results    None            Medical Decision Making  Problems Addressed:  Atrial flutter, unspecified type: acute illness or injury  Generalized weakness: acute illness or injury    Risk  Decision regarding hospitalization.      ED assessment:    Mr. De Santiago presented from outpatient surgery with newly noted atrial flutter with slow ventricular response (34 bpm).       Differential diagnosis (including but not limited to):   Medication effect, as slow AFib, complete heart block, electrolyte derangements    ED management:   Laboratory studies with leukocytosis (hx CLL) , TSH and troponin within normal limits.  Magnesium within normal limits.  Remainder of electrolytes within normal limits as well.  Discussed with cardiology who evaluated the patient, recommended admit, NPO at midnight, possible ablation versus pacemaker tomorrow.    Amount and/or Complexity of Data Reviewed  Independent historian: none   Summary of history:   External data reviewed: prior EKGs and prescription medications   Summary of data reviewed:  Previous EKG in May of this year with sinus bradycardia with a   Hypertension I10    Coronary artery spasm (HCC) I20.1    S/P cardiac cath Z98.890    CKD (chronic kidney disease) N18.9    History of pulmonary embolus (PE) Z86.711    Scoliosis M41.9    Spondylolysis of lumbar region M43.06    Severe obesity (BMI 35.0-39. 9) E66.01    Abnormal stress test R94.39    Coronary artery disease involving native coronary artery of native heart without angina pectoris I25.10    Unstable angina (HCC) I20.0    ACS (acute coronary syndrome) ([facility]) I24.9     Past Medical History:   Diagnosis Date    CAD (coronary artery disease)     stent placed on left 1 stent,in 2007    Cancer ([facility] [address]     BCCA    DVT (deep venous thrombosis) ([facility])     GERD (gastroesophageal reflux disease)     Hypercholesterolemia     Hypertension     Pulmonary embolism ([facility] [address] 9/15/2015    Thromboembolus ([facility] [address]     DVT after hernia operation, PE spontaneous 10 years later    Thyroid disease       Past Surgical History:   Procedure Laterality Date    HX GI      esophageal hiatal hernia - 2004    HX GYN      partial hysterectomy - 1970    HX OTHER SURGICAL      BCCAs removed    HX UROLOGICAL      bladder repair - 1999     Current Outpatient Medications   Medication Sig Dispense Refill    ALPRAZolam (XANAX) 0.25 mg tablet Take 1 Tablet by mouth nightly as needed for Anxiety or Sleep. Max Daily Amount: 0.25 mg. 30 Tablet 0    traMADoL (ULTRAM) 50 mg tablet Take 1 Tablet by mouth every eight (8) hours as needed for Pain for up to 30 days. Max Daily Amount: 150 mg. 60 Tablet 0    rosuvastatin (CRESTOR) 40 mg tablet TAKE ONE TABLET BY MOUTH EVERY EVENING 90 Tab 3    linaCLOtide (Linzess) 72 mcg cap capsule Take 1 Cap by mouth Daily (before breakfast).  90 Cap 1    lisinopriL (PRINIVIL, ZESTRIL) 5 mg tablet TAKE ONE TABLET BY MOUTH ONE TIME DAILY 90 Tab 1    levothyroxine (SYNTHROID) 75 mcg tablet TAKE ONE TABLET BY MOUTH ONE TIME DAILY BEFORE BREAKFAST 90 Tab 3    alendronate (FOSAMAX) 10 mg tablet TAKE ONE TABLET BY MOUTH EVERY MORNING BEFORE BREAKFAST 90 Tab 2    metoprolol tartrate (LOPRESSOR) 50 mg tablet TAKE ONE TABLET BY MOUTH TWICE A  Tab 3    naloxone (NARCAN) 4 mg/actuation nasal spray Use 1 spray intranasally, then discard. Repeat with new spray every 2 min as needed for opioid overdose symptoms, alternating nostrils. 1 Each 0    clotrimazole (LOTRIMIN) 1 % topical cream clotrimazole 1 % topical cream      ammonium lactate (LAC-HYDRIN) 12 % lotion APPLY TO AFFECTED AREA(S) TWO TIMES A DAY  GENERIC FOR LAC-HYDRIN  2    acetaminophen (TYLENOL ARTHRITIS PAIN) 650 mg TbER Take 650 mg by mouth every eight (8) hours.  multivitamin (ONE A DAY) tablet Take 1 Tab by mouth daily.  cholecalciferol, vitamin D3, (VITAMIN D3) 2,000 unit tab Take 1 Tab by mouth daily. 30 Tab 0    ACTIVATED CHARCOAL (CHARCOCAPS PO) Take  by mouth as needed.  nitroglycerin (NITROSTAT) 0.3 mg SL tablet 1 tablet by SubLINGual route every five (5) minutes as needed for Chest Pain. 1 Bottle 1    aspirin 81 mg Tab Take 81 mg by mouth daily.  melatonin 5 mg cap capsule Take 5 mg by mouth nightly. As needed (Patient not taking: Reported on 7/2/2021)       Allergies   Allergen Reactions    Codeine Unknown (comments)     Unsure of reaction.     Gabapentin Other (comments)     Hallucinations even at lowest dose    Morphine Hives and Itching       Family History   Problem Relation Age of Onset    Heart Disease Mother     Dementia Father     Diabetes Son     Kidney Disease Son         autoimmune     Social History     Tobacco Use    Smoking status: Never Smoker    Smokeless tobacco: Never Used   Substance Use Topics    Alcohol use: No         Araceli Gallagher MD first-degree AV block, resting heart rate in the 50s.  On home potassium supplementation otherwise no home medications, specifically no beta blockers or calcium channel blockers.  Risk and benefits of testing: discussed   Labs: ordered and reviewed    ECG/medicine tests: ordered and independent interpretation performed (see above or ED course)  Discussion of management or test interpretation with external provider(s): discussed with cardiology consultant   Summary of discussion:  Discussed with cardiology consultant, recommendations as below    Risk  Decision regarding hospitalization  Shared decision making     Critical Care  none    Fanta AVILES MD personally performed the history, PE, MDM, and procedures as documented above and agree with the scribe's documentation.                ED Course as of 08/13/24 1359   Mon Aug 12, 2024   [id number] Discussed with [facility], they evaluated the patient the bedside.  Recommend admit to them, NPO after midnight, plan for ablation [KS]      ED Course User Index  [KS] Fanta Najera MD                           Clinical Impression:  Final diagnoses:  [R53.1] Generalized weakness  [I48.92] Atrial flutter, unspecified type (Primary)          ED Disposition Condition    Admit Stable                Fnata Najera MD  08/13/24 4423

## 2024-08-13 ENCOUNTER — ANESTHESIA (OUTPATIENT)
Dept: CARDIOLOGY | Facility: HOSPITAL | Age: 79
End: 2024-08-13
Payer: MEDICARE

## 2024-08-13 ENCOUNTER — ANESTHESIA EVENT (OUTPATIENT)
Dept: CARDIOLOGY | Facility: HOSPITAL | Age: 79
End: 2024-08-13
Payer: MEDICARE

## 2024-08-13 VITALS
HEART RATE: 56 BPM | WEIGHT: 184.31 LBS | OXYGEN SATURATION: 95 % | TEMPERATURE: 98 F | DIASTOLIC BLOOD PRESSURE: 71 MMHG | SYSTOLIC BLOOD PRESSURE: 145 MMHG | HEIGHT: 66 IN | BODY MASS INDEX: 29.62 KG/M2 | RESPIRATION RATE: 18 BRPM

## 2024-08-13 PROBLEM — I48.92 ATRIAL FLUTTER: Status: ACTIVE | Noted: 2024-08-13

## 2024-08-13 LAB
ALBUMIN SERPL-MCNC: 3.2 G/DL (ref 3.4–4.8)
ALBUMIN/GLOB SERPL: 1.2 RATIO (ref 1.1–2)
ALP SERPL-CCNC: 45 UNIT/L (ref 40–150)
ALT SERPL-CCNC: 18 UNIT/L (ref 0–55)
ANION GAP SERPL CALC-SCNC: 6 MEQ/L
AST SERPL-CCNC: 21 UNIT/L (ref 5–34)
BASOPHILS # BLD AUTO: 0.05 X10(3)/MCL
BASOPHILS NFR BLD AUTO: 0.2 %
BILIRUB SERPL-MCNC: 0.5 MG/DL
BUN SERPL-MCNC: 19.4 MG/DL (ref 8.4–25.7)
CALCIUM SERPL-MCNC: 8.9 MG/DL (ref 8.8–10)
CHLORIDE SERPL-SCNC: 107 MMOL/L (ref 98–107)
CO2 SERPL-SCNC: 22 MMOL/L (ref 23–31)
CREAT SERPL-MCNC: 1.1 MG/DL (ref 0.73–1.18)
CREAT/UREA NIT SERPL: 18
EOSINOPHIL # BLD AUTO: 0.18 X10(3)/MCL (ref 0–0.9)
EOSINOPHIL NFR BLD AUTO: 0.8 %
ERYTHROCYTE [DISTWIDTH] IN BLOOD BY AUTOMATED COUNT: 13.8 % (ref 11.5–17)
GFR SERPLBLD CREATININE-BSD FMLA CKD-EPI: >60 ML/MIN/1.73/M2
GLOBULIN SER-MCNC: 2.6 GM/DL (ref 2.4–3.5)
GLUCOSE SERPL-MCNC: 119 MG/DL (ref 82–115)
HCT VFR BLD AUTO: 41.8 % (ref 42–52)
HEMATOLOGIST REVIEW: NORMAL
HGB BLD-MCNC: 14 G/DL (ref 14–18)
IMM GRANULOCYTES # BLD AUTO: 0.08 X10(3)/MCL (ref 0–0.04)
IMM GRANULOCYTES NFR BLD AUTO: 0.3 %
LYMPHOCYTES # BLD AUTO: 15.48 X10(3)/MCL (ref 0.6–4.6)
LYMPHOCYTES NFR BLD AUTO: 64.7 %
MAGNESIUM SERPL-MCNC: 1.9 MG/DL (ref 1.6–2.6)
MCH RBC QN AUTO: 32.6 PG (ref 27–31)
MCHC RBC AUTO-ENTMCNC: 33.5 G/DL (ref 33–36)
MCV RBC AUTO: 97.4 FL (ref 80–94)
MONOCYTES # BLD AUTO: 0.93 X10(3)/MCL (ref 0.1–1.3)
MONOCYTES NFR BLD AUTO: 3.9 %
NEUTROPHILS # BLD AUTO: 7.22 X10(3)/MCL (ref 2.1–9.2)
NEUTROPHILS NFR BLD AUTO: 30.1 %
NRBC BLD AUTO-RTO: 0 %
PLATELET # BLD AUTO: 168 X10(3)/MCL (ref 130–400)
PLATELETS.RETICULATED NFR BLD AUTO: 3.6 % (ref 0.9–11.2)
PMV BLD AUTO: 10.2 FL (ref 7.4–10.4)
POTASSIUM SERPL-SCNC: 4.5 MMOL/L (ref 3.5–5.1)
PROT SERPL-MCNC: 5.8 GM/DL (ref 5.8–7.6)
RBC # BLD AUTO: 4.29 X10(6)/MCL (ref 4.7–6.1)
SODIUM SERPL-SCNC: 135 MMOL/L (ref 136–145)
WBC # BLD AUTO: 23.94 X10(3)/MCL (ref 4.5–11.5)

## 2024-08-13 PROCEDURE — 25000003 PHARM REV CODE 250: Performed by: NURSE ANESTHETIST, CERTIFIED REGISTERED

## 2024-08-13 PROCEDURE — 63600175 PHARM REV CODE 636 W HCPCS: Performed by: NURSE PRACTITIONER

## 2024-08-13 PROCEDURE — 93005 ELECTROCARDIOGRAM TRACING: CPT

## 2024-08-13 PROCEDURE — 93010 ELECTROCARDIOGRAM REPORT: CPT | Mod: ,,, | Performed by: INTERNAL MEDICINE

## 2024-08-13 PROCEDURE — 85025 COMPLETE CBC W/AUTO DIFF WBC: CPT | Performed by: NURSE PRACTITIONER

## 2024-08-13 PROCEDURE — 80053 COMPREHEN METABOLIC PANEL: CPT | Performed by: NURSE PRACTITIONER

## 2024-08-13 PROCEDURE — 63600175 PHARM REV CODE 636 W HCPCS: Performed by: NURSE ANESTHETIST, CERTIFIED REGISTERED

## 2024-08-13 PROCEDURE — 92960 CARDIOVERSION ELECTRIC EXT: CPT

## 2024-08-13 PROCEDURE — 5A2204Z RESTORATION OF CARDIAC RHYTHM, SINGLE: ICD-10-PCS | Performed by: INTERNAL MEDICINE

## 2024-08-13 PROCEDURE — 36415 COLL VENOUS BLD VENIPUNCTURE: CPT | Performed by: NURSE PRACTITIONER

## 2024-08-13 PROCEDURE — 83735 ASSAY OF MAGNESIUM: CPT | Performed by: NURSE PRACTITIONER

## 2024-08-13 RX ORDER — PROPOFOL 10 MG/ML
VIAL (ML) INTRAVENOUS
Status: DISCONTINUED | OUTPATIENT
Start: 2024-08-13 | End: 2024-08-13

## 2024-08-13 RX ORDER — LIDOCAINE HYDROCHLORIDE 20 MG/ML
INJECTION, SOLUTION EPIDURAL; INFILTRATION; INTRACAUDAL; PERINEURAL
Status: DISCONTINUED | OUTPATIENT
Start: 2024-08-13 | End: 2024-08-13

## 2024-08-13 RX ORDER — ENOXAPARIN SODIUM 100 MG/ML
1 INJECTION SUBCUTANEOUS EVERY 12 HOURS
Status: DISCONTINUED | OUTPATIENT
Start: 2024-08-13 | End: 2024-08-13 | Stop reason: HOSPADM

## 2024-08-13 RX ADMIN — ENOXAPARIN SODIUM 80 MG: 100 INJECTION SUBCUTANEOUS at 10:08

## 2024-08-13 RX ADMIN — SODIUM CHLORIDE, SODIUM GLUCONATE, SODIUM ACETATE, POTASSIUM CHLORIDE AND MAGNESIUM CHLORIDE: 526; 502; 368; 37; 30 INJECTION, SOLUTION INTRAVENOUS at 01:08

## 2024-08-13 RX ADMIN — PROPOFOL 60 MG: 10 INJECTION, EMULSION INTRAVENOUS at 01:08

## 2024-08-13 RX ADMIN — PROPOFOL 10 MG: 10 INJECTION, EMULSION INTRAVENOUS at 01:08

## 2024-08-13 RX ADMIN — LIDOCAINE HYDROCHLORIDE 80 MG: 20 INJECTION, SOLUTION EPIDURAL; INFILTRATION; INTRACAUDAL; PERINEURAL at 01:08

## 2024-08-13 NOTE — TRANSFER OF CARE
"Anesthesia Transfer of Care Note    Patient: Javier De Santiago    Procedure(s) Performed: * No procedures listed *    Patient location: PACU    Anesthesia Type: general    Transport from OR: Transported from OR on 6-10 L/min O2 by face mask with adequate spontaneous ventilation    Post pain: adequate analgesia    Post assessment: no apparent anesthetic complications and tolerated procedure well    Post vital signs: stable    Level of consciousness: responds to stimulation    Nausea/Vomiting: no nausea/vomiting    Complications: none    Transfer of care protocol was followedComments: Report given to FAUSTO Fan.       Last vitals: Visit Vitals  BP (!) 153/72   Pulse (!) 38   Temp 36.6 °C (97.9 °F) (Oral)   Resp 18   Ht 5' 6" (1.676 m)   Wt 83.6 kg (184 lb 4.9 oz)   SpO2 95%   BMI 29.75 kg/m²     "

## 2024-08-13 NOTE — NURSING
Nurses Note -- 4 Eyes      8/13/2024   7:46 AM      Skin assessed during: Admit      [] No Altered Skin Integrity Present    []Prevention Measures Documented      [x] Yes- Altered Skin Integrity Present or Discovered   [x] LDA Added if Not in Epic (Describe Wound)   [x] New Altered Skin Integrity was Present on Admit and Documented in LDA   [] Wound Image Taken    Wound Care Consulted? No    Attending Nurse:  Amalia Chen RN/Staff Member:  Mirella Barry

## 2024-08-13 NOTE — PLAN OF CARE
08/13/24 1314   Discharge Assessment   Assessment Type Discharge Planning Assessment   Confirmed/corrected address, phone number and insurance Yes   Confirmed Demographics Correct on Facesheet   Source of Information family   If unable to respond/provide information was family/caregiver contacted? Yes   Contact Name/Number Information obtained from wife at bedside   Communicated TANESHA with patient/caregiver Date not available/Unable to determine   Reason For Admission Afib   People in Home spouse   Do you expect to return to your current living situation? Yes   Do you have help at home or someone to help you manage your care at home? Yes   Who are your caregiver(s) and their phone number(s)? Jose Miguel Golden   Prior to hospitilization cognitive status: Alert/Oriented   Current cognitive status: Alert/Oriented   Walking or Climbing Stairs Difficulty no   Dressing/Bathing Difficulty no   Equipment Currently Used at Home none   Patient currently being followed by outpatient case management? No   Do you currently have service(s) that help you manage your care at home? No   Do you take prescription medications? Yes   Do you have prescription coverage? Yes   Coverage [employer] [place] MEDICARE   Do you have any problems affording any of your prescribed medications? No   Is the patient taking medications as prescribed? yes   Who is going to help you get home at discharge? Wife   How do you get to doctors appointments? car, drives self   Are you on dialysis? No   Do you take coumadin? No   Discharge Plan A Home   Discharge Plan B Home   DME Needed Upon Discharge  none   Discharge Plan discussed with: Spouse/sig other   Transition of Care Barriers None   OTHER   Name(s) of People in Home Jose Miguel Golden

## 2024-08-13 NOTE — PROGRESS NOTES
[facility] 4th Floor Medical Telemetry  Wound Care    Patient Name:  Javier De Santiago   MRN:  [id number]  Date: 8/13/2024  Diagnosis: Atrial flutter    History:     Past Medical History:   Diagnosis Date    Class 1 obesity due to excess calories with serious comorbidity and body mass index (BMI) of 30.0 to 30.9 in adult 1/22/2023    Fatigue     H/O colectomy     H/O ileostomy     Hyperlipidemia        Social History     Socioeconomic History    Marital status:    Tobacco Use    Smoking status: Every Day     Types: Pipe    Smokeless tobacco: Never    Tobacco comments:     A pipe a day keeps the ...   Substance and Sexual Activity    Drug use: Never       Precautions:     Allergies as of 08/12/2024    (No Known Allergies)       WOC Assessment Details/Treatment     WOCN consulted for ileostomy. Pt. Jsut arriving back to room. Introduced self to pt. Put a bag of ostomy supplies on pt.'s bedside table. Let pt. Know that we ordered more supplies for ostomy and it should be coming up soon. Nurse at bedside. Family at bedside. No questions from patient. Will follow up.     08/13/2024

## 2024-08-13 NOTE — PLAN OF CARE
08/13/24 1313   Medicare Message   Important Message from Medicare regarding Discharge Appeal Rights Given to patient/caregiver;Explained to patient/caregiver

## 2024-08-13 NOTE — DISCHARGE SUMMARY
[facility] - 4th Floor Medical Telemetry  Short Stay Discharge Summary    Cardiology    * No surgery found *    OUTCOME: Patient tolerated treatment/procedure well without complication and is now ready for discharge.    DISPOSITION: Home or Self Care    FINAL DIAGNOSIS:  <principal problem not specified>    FOLLOWUP: In clinic    Chief Complaint:  Afib SVR     HPI: Mr. De Santiago is a 79 y/o that is known to [facility], Dr. Vincent. He was previously cleared for a Sinus Surgery. The patient presented for the procedure this AM (8.12.2024) and was given Versed and became bradycardiac. He was transferred to the ER where he was found to be bradycardiac SVR. He will be admitted and evaluated by [facility].     Hospital Course:   8.13.24: NAD, Tele review reveals Afib with SVR. He denies SOB, CP, weakness, or dizziness      PMH: Heart Murmur, Hyperlipidemia, Obese  PSH: Tonsillectomy, History of Ileostomy, History of Colectomy, Cysto-Retrograde pyelogram, balloon dilation, stent placement.  Family History: Father- Systolic CHF; Mother - Dementia  Social History: Current smoker (tobacco and pipe), ETOH (social), denies Illicit drug use     Previous Cardiac Diagnostics:   PET- 6.20.24  This is a normal perfusion study, no perfusion defects noted. There is no evidence of ischemia.   Very small inferoapical artifact.  This scan is suggestive of low risk for future cardiovascular events.   The left ventricular cavity is noted to be normal on the stress studies. The stress left ventricular ejection fraction was calculated to be 69% and left ventricular global function is normal. The rest left ventricular cavity is noted to be normal. The rest left ventricular ejection fraction was calculated to be 60% and rest left ventricular global function is normal.   When compared to the resting ejection fraction (60%), the stress ejection fraction (69%) has increased.   The study quality is excellent.   There was a rise in myocardial blood  flow between rest and stress.  Global myocardial blood flow reserve was 2.52.  Myocardial blood flow reserve is reduced in the apical territory which is suggestive of flow limiting stenosis in this territory.     Inter-modality Comparison Vs Nuclear Scan 6.5.24  Ejection fraction essentially remained unchanged (65% previous study, 60% current study).      ECHO 6.20.24  The study quality is average.   The left ventricle is normal in size. Global left ventricular systolic function is normal. The left ventricular ejection fraction is 60%. The left ventricle diastolic function is impaired (Grade I) with normal left atrial pressure. Mild asymmetric basal left ventricular hypertrophy is present.   The left atrial diameter is mildly increased. (4.1 cm). The left atrium is normal in size based on the left atrium volume index of 30.0ml/m².  Mild (1+) mitral regurgitation. Mild mitral annular calcification is noted.  Mild (1+) aortic regurgitation. Mild calcification of the aortic valve is noted with adequate cuspal excursion.  The pulmonary artery systolic pressure is 28 mmHg.      Intra-modality comparison (ECHO) 6.5.17    DISCHARGE INSTRUCTIONS:   Discharge Procedure Orders   Ambulatory referral/consult to Smoking Cessation Program   Standing Status: Future   Referral Priority: Routine Referral Type: Consultation   Referral Reason: Specialty Services Required   Requested Specialty: CTTS   Number of Visits Requested: 1     Diet Cardiac     No driving until:     Reason for not Prescribing Nicotine Replacement     Order Specific Question Answer Comments   Reason for not Prescribing: Patient refused      Activity as tolerated     Shower on day dressing removed (No bath)       Assessment:   Afib SVR    - TSH Normal    - ECHO (6.20.2024) EF 60%    - PET (6.20.2024) No evidence of ischemia  Leukocytosis  Hypertension  No History of GI Bleed        Plan:   Tele reviewed  Hold all AV shelby blocking medication and rate blocking  medications   Patient after FELY/DCCV  Will start Eliquis for CVA risk reduction   Will f/u with Dr. Watts in clinic in 1 to 2 weeks  Discharge home today         TIME SPENT ON DISCHARGE: 35 minutes

## 2024-08-13 NOTE — ANESTHESIA PREPROCEDURE EVALUATION
08/13/2024  Javier De Santiago is a 78 y.o., male.  Afib w/ RVR  EF 60%  FELY/DCCV    Pre-op Assessment    I have reviewed the Patient Summary Reports.     I have reviewed the Nursing Notes. I have reviewed the NPO Status.   I have reviewed the Medications.     Review of Systems  Anesthesia Hx:  No problems with previous Anesthesia                    Physical Exam  General: Well nourished, Cooperative, Alert and Oriented    Airway:  Mallampati: II   Mouth Opening: Normal  TM Distance: Normal  Tongue: Normal  Neck ROM: Normal ROM    Dental:  Intact        Anesthesia Plan  Type of Anesthesia, risks & benefits discussed:    Anesthesia Type: Gen Natural Airway  Intra-op Monitoring Plan: Standard ASA Monitors  Post Op Pain Control Plan: multimodal analgesia  Induction:  IV  Informed Consent: Informed consent signed with the Patient and all parties understand the risks and agree with anesthesia plan.  All questions answered. Patient consented to blood products? Yes  ASA Score: 3  Day of Surgery Review of History & Physical: H&P Update referred to the surgeon/provider.    Ready For Surgery From Anesthesia Perspective.     .

## 2024-08-13 NOTE — PROGRESS NOTES
[facility] - 4th Floor Medical Telemetry    Cardiology  Progress Note    Patient Name: Javier De Santiago  MRN: [id number]  Admission Date: 8/12/2024  Hospital Length of Stay: 1 days  Code Status: No Order   Attending Physician: Vince Watts MD   Primary Care Physician: Mick Lorenzana MD  Expected Discharge Date:   Principal Problem:Atrial flutter    Subjective:     Chief Complaint:  Afib SVR     HPI: Mr. De Santiago is a 77 y/o that is known to [facility], Dr. Vincent. He was previously cleared for a Sinus Surgery. The patient presented for the procedure this AM (8.12.2024) and was given Versed and became bradycardiac. He was transferred to the ER where he was found to be bradycardiac SVR. He will be admitted and evaluated by [facility].    Hospital Course:   8.13.24: NAD, Tele review reveals Afib with SVR. He denies SOB, CP, weakness, or dizziness     PMH: Heart Murmur, Hyperlipidemia, Obese  PSH: Tonsillectomy, History of Ileostomy, History of Colectomy, Cysto-Retrograde pyelogram, balloon dilation, stent placement.  Family History: Father- Systolic CHF; Mother - Dementia  Social History: Current smoker (tobacco and pipe), ETOH (social), denies Illicit drug use     Previous Cardiac Diagnostics:   PET- 6.20.24  This is a normal perfusion study, no perfusion defects noted. There is no evidence of ischemia.   Very small inferoapical artifact.  This scan is suggestive of low risk for future cardiovascular events.   The left ventricular cavity is noted to be normal on the stress studies. The stress left ventricular ejection fraction was calculated to be 69% and left ventricular global function is normal. The rest left ventricular cavity is noted to be normal. The rest left ventricular ejection fraction was calculated to be 60% and rest left ventricular global function is normal.   When compared to the resting ejection fraction (60%), the stress ejection fraction (69%) has increased.   The study quality is excellent.    There was a rise in myocardial blood flow between rest and stress.  Global myocardial blood flow reserve was 2.52.  Myocardial blood flow reserve is reduced in the apical territory which is suggestive of flow limiting stenosis in this territory.     Inter-modality Comparison Vs Nuclear Scan 6.5.24  Ejection fraction essentially remained unchanged (65% previous study, 60% current study).      ECHO 6.20.24  The study quality is average.   The left ventricle is normal in size. Global left ventricular systolic function is normal. The left ventricular ejection fraction is 60%. The left ventricle diastolic function is impaired (Grade I) with normal left atrial pressure. Mild asymmetric basal left ventricular hypertrophy is present.   The left atrial diameter is mildly increased. (4.1 cm). The left atrium is normal in size based on the left atrium volume index of 30.0ml/m².  Mild (1+) mitral regurgitation. Mild mitral annular calcification is noted.  Mild (1+) aortic regurgitation. Mild calcification of the aortic valve is noted with adequate cuspal excursion.  The pulmonary artery systolic pressure is 28 mmHg.      Intra-modality comparison (ECHO) 6.5.17     Ejection fraction essentially remained unchanged (65% previous study, 60% current study).   Aortic root diameter remains unchanged (3.5 cm).     Review of Systems   Constitutional: Negative for chills and fever.   Cardiovascular:  Negative for chest pain, palpitations and syncope.   Respiratory:  Negative for cough and shortness of breath.    Gastrointestinal:  Negative for abdominal pain, nausea and vomiting.   Neurological: Negative.      Objective:     Vital Signs (Most Recent):  Temp: 97.9 °F (36.6 °C) (08/13/24 1122)  Pulse: (!) 38 (08/13/24 1323)  Resp: 18 (08/13/24 0423)  BP: (!) 153/72 (08/13/24 1323)  SpO2: 95 % (08/13/24 1323) Vital Signs (24h Range):  Temp:  [97.6 °F (36.4 °C)-98.4 °F (36.9 °C)] 97.9 °F (36.6 °C)  Pulse:  [34-47] 38  Resp:  [16-18]  18  SpO2:  [95 %-98 %] 95 %  BP: (111-154)/(63-77) 153/72   Weight: 83.6 kg (184 lb 4.9 oz)  Body mass index is 29.75 kg/m².  SpO2: 95 %       Intake/Output Summary (Last 24 hours) at 8/13/2024 1423  Last data filed at 8/13/2024 1329  Gross per 24 hour   Intake 340 ml   Output 300 ml   Net 40 ml     Lines/Drains/Airways       Drain  Duration                  Colostomy 08/13/24 0705 RLQ <1 day              Peripheral Intravenous Line  Duration                  Peripheral IV - Single Lumen 08/12/24 1456 18 G Right Antecubital <1 day                    Significant Labs:   Chemistries:   Recent Labs   Lab 08/12/24  1441 08/13/24  0408    135*   K 4.2 4.5    107   CO2 25 22*   BUN 15.7 19.4   CREATININE 1.17 1.10   CALCIUM 9.8 8.9   BILITOT 0.8 0.5   ALKPHOS 58 45   ALT 22 18   AST 24 21   GLUCOSE 116* 119*   MG 1.90 1.90   TROPONINI 0.013  --         CBC/Anemia Labs: Coags:    Recent Labs   Lab 08/12/24  1441 08/13/24  0408   WBC 26.67  26.67* 23.94*   HGB 16.1 14.0   HCT 48.4 41.8*    168   MCV 97.0* 97.4*   RDW 13.9 13.8    Recent Labs   Lab 08/12/24  1451   INR 1.0   APTT 25.8        Telemetry:  Afib SVR    Physical Exam  Constitutional:       Appearance: Normal appearance.   HENT:      Head: Normocephalic.      Mouth/Throat:      Mouth: Mucous membranes are moist.   Cardiovascular:      Rate and Rhythm: Bradycardia present. Rhythm irregular.      Pulses: Normal pulses.      Heart sounds: Normal heart sounds. No murmur heard.  Pulmonary:      Effort: Pulmonary effort is normal. No respiratory distress.      Breath sounds: Normal breath sounds.   Abdominal:      General: Abdomen is flat.   Neurological:      Mental Status: He is alert and oriented to person, place, and time.   Psychiatric:         Mood and Affect: Mood normal.         Behavior: Behavior normal.         Judgment: Judgment normal.         Current Schedule Inpatient Medications:   enoxparin  1 mg/kg Subcutaneous Q12H (treatment,  non-standard time)     Continuous Infusions:      Assessment:   Afib SVR    - TSH Normal    - ECHO (6.20.2024) EF 60%    - PET (6.20.2024) No evidence of ischemia  Leukocytosis  Hypertension  No History of GI Bleed      Plan:   Tele reviewed  Hold all AV shelby blocking medication and rate blocking medications   Case discussed with EP ~ will plan for FELY/DCCV today with Dr. Watts  Risk and benefits discussed for FELY/DCCV, patient agreeable to proceed ~ informed consent obtained and placed on the chart  Keep NPO until after procedure           Kathryn Hines NP  Cardiology  [facility] - 4th Floor Medical Telemetry    I agree with the findings of the complexity of problems addressed and take responsibility for the plan's risks and complications. I approved the plan documented by Kathryn Hines NP.  As above

## 2024-08-14 ENCOUNTER — PATIENT OUTREACH (OUTPATIENT)
Dept: ADMINISTRATIVE | Facility: CLINIC | Age: 79
End: 2024-08-14
Payer: MEDICARE

## 2024-08-14 LAB
OHS QRS DURATION: 104 MS
OHS QRS DURATION: 92 MS
OHS QTC CALCULATION: 395 MS
OHS QTC CALCULATION: 396 MS

## 2024-08-14 NOTE — PROGRESS NOTES
C3 nurse spoke with Javier De Santiago  for a TCC post hospital discharge follow up call. The patient has a scheduled HOSFU appointment with Guille Watts MD (Cardiology) on 8/28/2024; @ 10:30AM

## 2024-08-14 NOTE — PROGRESS NOTES
C3 nurse attempted to contact Javier De Santiago  for a TCC post hospital discharge follow up call. No answer. Left voicemail with callback information. The patient has a scheduled HOSFU appointment with Guille Watts MD (Cardiology) on 8/28/2024; @ 10:30AM

## 2024-08-15 NOTE — ANESTHESIA POSTPROCEDURE EVALUATION
Anesthesia Post Evaluation    Patient: Javier De Santiago    Procedure(s) Performed: * No procedures listed *    Final Anesthesia Type: general      Patient location during evaluation: PACU  Patient participation: Yes- Able to Participate  Level of consciousness: awake and alert  Post-procedure vital signs: reviewed and stable  Pain management: adequate  Airway patency: patent      Anesthetic complications: no      Cardiovascular status: hemodynamically stable  Respiratory status: unassisted  Hydration status: euvolemic  Follow-up not needed.              Vitals Value Taken Time   /71 08/13/24 1534   Temp 36.6 °C (97.9 °F) 08/13/24 1534   Pulse 56 08/13/24 1534   Resp 16 08/15/24 1523   SpO2 95 % 08/13/24 1441         No case tracking events are documented in the log.      Pain/Hai Score: No data recorded

## 2024-09-12 ENCOUNTER — HOSPITAL ENCOUNTER (OUTPATIENT)
Facility: HOSPITAL | Age: 79
Discharge: HOME | End: 2024-09-12
Attending: INTERNAL MEDICINE | Admitting: INTERNAL MEDICINE
Payer: MEDICARE

## 2024-09-12 VITALS
HEIGHT: 66 IN | HEART RATE: 58 BPM | BODY MASS INDEX: 28.45 KG/M2 | DIASTOLIC BLOOD PRESSURE: 75 MMHG | WEIGHT: 177 LBS | OXYGEN SATURATION: 97 % | SYSTOLIC BLOOD PRESSURE: 143 MMHG | TEMPERATURE: 98 F | RESPIRATION RATE: 21 BRPM

## 2024-09-12 DIAGNOSIS — I49.9 ARRHYTHMIA: ICD-10-CM

## 2024-09-12 DIAGNOSIS — I48.91 ATRIAL FIBRILLATION: ICD-10-CM

## 2024-09-12 DIAGNOSIS — I48.91 A-FIB: ICD-10-CM

## 2024-09-12 DIAGNOSIS — I44.0 FIRST DEGREE HEART BLOCK: ICD-10-CM

## 2024-09-12 PROBLEM — I44.30 AV BLOCK: Status: ACTIVE | Noted: 2024-09-12

## 2024-09-12 LAB
BASOPHILS # BLD AUTO: 0.09 X10(3)/MCL
BASOPHILS NFR BLD AUTO: 0.3 %
EOSINOPHIL # BLD AUTO: 0.21 X10(3)/MCL (ref 0–0.9)
EOSINOPHIL NFR BLD AUTO: 0.7 %
ERYTHROCYTE [DISTWIDTH] IN BLOOD BY AUTOMATED COUNT: 13.8 % (ref 11.5–17)
HCT VFR BLD AUTO: 45 % (ref 42–52)
HGB BLD-MCNC: 15.2 G/DL (ref 14–18)
IMM GRANULOCYTES # BLD AUTO: 0.14 X10(3)/MCL (ref 0–0.04)
IMM GRANULOCYTES NFR BLD AUTO: 0.4 %
LYMPHOCYTES # BLD AUTO: 22.79 X10(3)/MCL (ref 0.6–4.6)
LYMPHOCYTES NFR BLD AUTO: 71.5 %
MCH RBC QN AUTO: 32.5 PG (ref 27–31)
MCHC RBC AUTO-ENTMCNC: 33.8 G/DL (ref 33–36)
MCV RBC AUTO: 96.2 FL (ref 80–94)
MONOCYTES # BLD AUTO: 0.76 X10(3)/MCL (ref 0.1–1.3)
MONOCYTES NFR BLD AUTO: 2.4 %
NEUTROPHILS # BLD AUTO: 7.9 X10(3)/MCL (ref 2.1–9.2)
NEUTROPHILS NFR BLD AUTO: 24.7 %
NRBC BLD AUTO-RTO: 0 %
OHS QRS DURATION: 116 MS
OHS QRS DURATION: 136 MS
OHS QTC CALCULATION: 441 MS
OHS QTC CALCULATION: 466 MS
PLATELET # BLD AUTO: 201 X10(3)/MCL (ref 130–400)
PMV BLD AUTO: 9.8 FL (ref 7.4–10.4)
RBC # BLD AUTO: 4.68 X10(6)/MCL (ref 4.7–6.1)
WBC # BLD AUTO: 31.89 X10(3)/MCL (ref 4.5–11.5)

## 2024-09-12 PROCEDURE — C1785 PMKR, DUAL, RATE-RESP: HCPCS | Performed by: INTERNAL MEDICINE

## 2024-09-12 PROCEDURE — 93010 ELECTROCARDIOGRAM REPORT: CPT | Mod: 76,,, | Performed by: INTERNAL MEDICINE

## 2024-09-12 PROCEDURE — C1769 GUIDE WIRE: HCPCS | Performed by: INTERNAL MEDICINE

## 2024-09-12 PROCEDURE — 25000003 PHARM REV CODE 250: Performed by: INTERNAL MEDICINE

## 2024-09-12 PROCEDURE — 93005 ELECTROCARDIOGRAM TRACING: CPT

## 2024-09-12 PROCEDURE — 85025 COMPLETE CBC W/AUTO DIFF WBC: CPT | Performed by: INTERNAL MEDICINE

## 2024-09-12 PROCEDURE — C1887 CATHETER, GUIDING: HCPCS | Performed by: INTERNAL MEDICINE

## 2024-09-12 PROCEDURE — 36415 COLL VENOUS BLD VENIPUNCTURE: CPT | Performed by: INTERNAL MEDICINE

## 2024-09-12 PROCEDURE — 99152 MOD SED SAME PHYS/QHP 5/>YRS: CPT | Performed by: INTERNAL MEDICINE

## 2024-09-12 PROCEDURE — C1898 LEAD, PMKR, OTHER THAN TRANS: HCPCS | Performed by: INTERNAL MEDICINE

## 2024-09-12 PROCEDURE — 63600175 PHARM REV CODE 636 W HCPCS: Performed by: INTERNAL MEDICINE

## 2024-09-12 PROCEDURE — 99153 MOD SED SAME PHYS/QHP EA: CPT | Performed by: INTERNAL MEDICINE

## 2024-09-12 DEVICE — LEAD 407652 CAPSUREFIX NOVUS US MRI
Type: IMPLANTABLE DEVICE | Site: OTHER (ADD COMMENT) | Status: FUNCTIONAL
Brand: CAPSUREFIX NOVUS MRI™ SURESCAN™

## 2024-09-12 DEVICE — IPG W1DR01 AZURE XT DR MRI USA
Type: IMPLANTABLE DEVICE | Site: OTHER (ADD COMMENT) | Status: FUNCTIONAL
Brand: AZURE™ XT DR MRI SURESCAN™

## 2024-09-12 DEVICE — LEAD 3830 US MKT/ 69CM MRI LBBAP
Type: IMPLANTABLE DEVICE | Site: OTHER (ADD COMMENT) | Status: FUNCTIONAL
Brand: SELECTSECURE™ MRI SURESCAN™

## 2024-09-12 RX ORDER — DIPHENHYDRAMINE HYDROCHLORIDE 50 MG/ML
INJECTION INTRAMUSCULAR; INTRAVENOUS
Status: DISCONTINUED | OUTPATIENT
Start: 2024-09-12 | End: 2024-09-12 | Stop reason: HOSPADM

## 2024-09-12 RX ORDER — ACETAMINOPHEN 325 MG/1
650 TABLET ORAL EVERY 4 HOURS PRN
Status: DISCONTINUED | OUTPATIENT
Start: 2024-09-12 | End: 2024-09-12 | Stop reason: HOSPADM

## 2024-09-12 RX ORDER — MIDAZOLAM HYDROCHLORIDE 1 MG/ML
INJECTION INTRAMUSCULAR; INTRAVENOUS
Status: DISCONTINUED | OUTPATIENT
Start: 2024-09-12 | End: 2024-09-12 | Stop reason: HOSPADM

## 2024-09-12 RX ORDER — SODIUM CHLORIDE 9 MG/ML
INJECTION, SOLUTION INTRAVENOUS ONCE
Status: DISCONTINUED | OUTPATIENT
Start: 2024-09-12 | End: 2024-09-12 | Stop reason: HOSPADM

## 2024-09-12 RX ORDER — FENTANYL CITRATE 50 UG/ML
INJECTION, SOLUTION INTRAMUSCULAR; INTRAVENOUS
Status: DISCONTINUED | OUTPATIENT
Start: 2024-09-12 | End: 2024-09-12 | Stop reason: HOSPADM

## 2024-09-12 RX ORDER — LIDOCAINE HYDROCHLORIDE 10 MG/ML
INJECTION, SOLUTION INFILTRATION; PERINEURAL
Status: DISCONTINUED | OUTPATIENT
Start: 2024-09-12 | End: 2024-09-12 | Stop reason: HOSPADM

## 2024-09-12 RX ORDER — HYDROCODONE BITARTRATE AND ACETAMINOPHEN 5; 325 MG/1; MG/1
1 TABLET ORAL EVERY 4 HOURS PRN
Status: DISCONTINUED | OUTPATIENT
Start: 2024-09-12 | End: 2024-09-12 | Stop reason: HOSPADM

## 2024-09-12 NOTE — DISCHARGE INSTRUCTIONS
Resume Eliquis in 2 days.   
[email or web address]

## 2024-09-12 NOTE — Clinical Note
The lead was inserted under fluorscopic guidance, thresholds were tested, was connected to generator, was sutured in place and was secured in place. A new lead was attached to the left bundle.

## 2024-09-25 ENCOUNTER — TELEPHONE (OUTPATIENT)
Dept: PRIMARY CARE CLINIC | Facility: CLINIC | Age: 79
End: 2024-09-25
Payer: MEDICARE

## 2024-09-25 PROBLEM — I48.92 ATRIAL FLUTTER: Chronic | Status: ACTIVE | Noted: 2024-08-13

## 2024-09-25 PROBLEM — I48.91 NEW ONSET A-FIB: Status: ACTIVE | Noted: 2024-08-28

## 2024-09-25 PROBLEM — R01.1 MURMUR, HEART: Status: ACTIVE | Noted: 2017-04-25

## 2024-09-25 NOTE — TELEPHONE ENCOUNTER
Pre Visit Call    Pt has visit on ....09/26  Did pt confirm appointment?no  Did pt answer or left vm on mobile number?left vm   Wellness labs done?

## 2024-09-26 ENCOUNTER — OFFICE VISIT (OUTPATIENT)
Dept: PRIMARY CARE CLINIC | Facility: CLINIC | Age: 79
End: 2024-09-26
Payer: MEDICARE

## 2024-09-26 VITALS
BODY MASS INDEX: 28.61 KG/M2 | HEIGHT: 66 IN | DIASTOLIC BLOOD PRESSURE: 78 MMHG | SYSTOLIC BLOOD PRESSURE: 136 MMHG | RESPIRATION RATE: 18 BRPM | WEIGHT: 178 LBS | HEART RATE: 66 BPM | OXYGEN SATURATION: 99 %

## 2024-09-26 DIAGNOSIS — I48.92 ATRIAL FLUTTER, UNSPECIFIED TYPE: Chronic | ICD-10-CM

## 2024-09-26 DIAGNOSIS — E78.5 DYSLIPIDEMIA: Chronic | ICD-10-CM

## 2024-09-26 DIAGNOSIS — J32.4 CHRONIC PANSINUSITIS: Chronic | ICD-10-CM

## 2024-09-26 DIAGNOSIS — I48.0 PAROXYSMAL ATRIAL FIBRILLATION: ICD-10-CM

## 2024-09-26 DIAGNOSIS — C91.10 CLL (CHRONIC LYMPHOCYTIC LEUKEMIA): Primary | Chronic | ICD-10-CM

## 2024-09-26 DIAGNOSIS — Z93.2 ILEOSTOMY PRESENT: Chronic | ICD-10-CM

## 2024-09-26 RX ORDER — FLUTICASONE PROPIONATE 50 MCG
1 SPRAY, SUSPENSION (ML) NASAL DAILY
COMMUNITY

## 2024-09-26 NOTE — PROGRESS NOTES
"Subjective:       Patient ID: Javier De Santiago is a 79 y.o. male.    Chief Complaint: No chief complaint on file.    Patient presents to the clinic today for chronic condition follow-up.  Doing well, no specific complaints, tolerating all medications, reporting no significant side effects or problems.    Last wellness exam was 05/03/2023.    Chronic conditions being treated include but are not limited to dyslipidemia, history of atrial fibrillation, CLL.      Review of Systems   Constitutional: Negative.  Negative for fatigue and fever.   HENT: Negative.  Negative for sore throat and trouble swallowing.    Eyes: Negative.  Negative for redness and visual disturbance.   Respiratory: Negative.  Negative for chest tightness and shortness of breath.    Cardiovascular: Negative.  Negative for chest pain.   Gastrointestinal: Negative.  Negative for abdominal pain, blood in stool and nausea.   Endocrine: Negative.  Negative for cold intolerance, heat intolerance and polyuria.   Genitourinary: Negative.    Musculoskeletal: Negative.  Negative for arthralgias, gait problem and joint swelling.   Integumentary:  Negative for rash. Negative.   Neurological: Negative.  Negative for dizziness, weakness and headaches.   Psychiatric/Behavioral: Negative.  Negative for agitation and dysphoric mood.            Patient Care Team:  Mick Lorenzana MD as PCP - General (Family Medicine)  Jatin Regan MD as Consulting Physician (Urology)  Ousmane Cho MD as Consulting Physician (Ophthalmology)  Benoit Vincent MD as Consulting Physician (Cardiology)  Guille Watts MD as Consulting Physician (Cardiology)  Objective:   Visit Vitals  /78   Pulse 66   Resp 18   Ht 5' 6" (1.676 m)   Wt 80.7 kg (178 lb)   SpO2 99%   BMI 28.73 kg/m²        Physical Exam  Constitutional:       Appearance: Normal appearance.   HENT:      Head: Normocephalic.      Mouth/Throat:      Mouth: Mucous membranes are moist.      Pharynx: Oropharynx is clear. "   Eyes:      Conjunctiva/sclera: Conjunctivae normal.      Pupils: Pupils are equal, round, and reactive to light.   Cardiovascular:      Rate and Rhythm: Normal rate and regular rhythm.      Heart sounds: Normal heart sounds.   Pulmonary:      Effort: Pulmonary effort is normal.      Breath sounds: Normal breath sounds.   Abdominal:      General: Abdomen is flat.      Palpations: Abdomen is soft.   Musculoskeletal:         General: Normal range of motion.      Cervical back: Neck supple.   Skin:     General: Skin is warm and dry.   Neurological:      General: No focal deficit present.      Mental Status: He is alert and oriented to person, place, and time.   Psychiatric:         Mood and Affect: Mood normal.         Behavior: Behavior normal.         Thought Content: Thought content normal.         Judgment: Judgment normal.           Lab Results   Component Value Date     (L) 08/13/2024    K 4.5 08/13/2024     08/13/2024    CO2 22 (L) 08/13/2024    BUN 19.4 08/13/2024    CREATININE 1.10 08/13/2024    CALCIUM 8.9 08/13/2024    ALBUMIN 3.2 (L) 08/13/2024    BILITOT 0.5 08/13/2024    ALKPHOS 45 08/13/2024    AST 21 08/13/2024    ALT 18 08/13/2024    EGFRNORACEVR >60 08/13/2024     Lab Results   Component Value Date    WBC 31.89 (H) 09/12/2024    RBC 4.68 (L) 09/12/2024    HGB 15.2 09/12/2024    HCT 45.0 09/12/2024    MCV 96.2 (H) 09/12/2024    RDW 13.8 09/12/2024     09/12/2024      Lab Results   Component Value Date    CHOL 197 05/01/2024    TRIG 238 (H) 05/01/2024    HDL 47 05/01/2024    .00 05/01/2024    TOTALCHOLEST 4 05/01/2024     Lab Results   Component Value Date    TSH 1.714 08/12/2024     Lab Results   Component Value Date    HGBA1C 5.5 05/01/2024        Lab Results   Component Value Date    PSA 0.92 05/01/2024         Assessment:       ICD-10-CM ICD-9-CM   1. CLL (chronic lymphocytic leukemia)  C91.10 204.10   2. Atrial flutter, unspecified type  I48.92 427.32   3. Paroxysmal  atrial fibrillation  I48.0 427.31   4. Dyslipidemia  E78.5 272.4   5. Ileostomy present  Z93.2 V44.2   6. Chronic pansinusitis  J32.4 473.8       Current Outpatient Medications   Medication Instructions    apixaban (ELIQUIS) 5 mg, Oral, 2 times daily    fluticasone propionate (FLONASE) 50 mcg/actuation nasal spray 1 spray, Each Nostril, Daily    potassium citrate (UROCIT-K) 10 mEq (1,080 mg) TbSR 10 mEq, Oral, 3 times daily     Plan:     Problem List Items Addressed This Visit          ENT    Chronic pansinusitis (Chronic)    Overview     Patient due to have sinus surgery, but had to have his cardiac issues addressed initially.  Suggested that he return to see his otolaryngologist for re-evaluation, and hopefully he can proceed with aggressive treatment.  Does does seem to be an ongoing issue.            Cardiac/Vascular    Dyslipidemia (Chronic)    Overview     Dyslipidemia is being treated using lifestyle modification only.  Patient is not being prescribed lipid-lowering medication.  As discussed, we will continue to monitor this, and may ultimately choose to prescribe medication if this becomes difficult to control utilizing lifestyle modification only.         Atrial flutter (Chronic)    Overview     Documented atrial flutter on EKG dating 08/13/2024.  Sees Dr. Vincent.  Has demand pacemaker.    Patient has a history of cardiac/cardiovascular disease.  Patient does see a cardiologist on a regular basis, cardiovascular condition currently appears to be stable, with no signs of decompensation.  Patient will continue regular cardiology follow-up.         Current Assessment & Plan     Sinus rhythm on today's exam.         Paroxysmal atrial fibrillation (Chronic)    Overview     Sees Dr. Vincent, cardiology.  Diagnosed with AFib recently, new onset, takes Eliquis 5 mg b.i.d..  Also, has a demand pacemaker.    Patient has a history of cardiac/cardiovascular disease.  Patient does see a cardiologist on a regular basis,  cardiovascular condition currently appears to be stable, with no signs of decompensation.  Patient will continue regular cardiology follow-up.              Oncology    CLL (chronic lymphocytic leukemia) - Primary (Chronic)    Overview     From Heme-Onc note, June 2024:    Plan  Patient with CLL, newly diagnosed.  WBC mildly elevated, normal H/H and normal platelets.  Patient has fatigue, but doubt from disease as he has no anemia or thrombocytopenia or other B-symptoms.      No clear indications for any treatment at this time.  Will check LDH, beta-2 microglobulin and FISH CLL panel.     Plan for observation only for now.            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                    Follow up in about 4 months (around 1/15/2025) for Chronic condition F/up.    This note was created with the assistance of [employer] voice recognition software or phone dictation. There may be transcription errors as a result of using this technology. Effort has been made to assure accuracy of transcription but any obvious errors or omissions should be clarified with the author of the document.

## 2024-09-30 ENCOUNTER — LAB VISIT (OUTPATIENT)
Dept: LAB | Facility: HOSPITAL | Age: 79
End: 2024-09-30
Attending: INTERNAL MEDICINE
Payer: MEDICARE

## 2024-09-30 DIAGNOSIS — C91.10 CLL (CHRONIC LYMPHOCYTIC LEUKEMIA): ICD-10-CM

## 2024-09-30 DIAGNOSIS — D72.0 GENETIC ANOMALIES OF LEUKOCYTES: ICD-10-CM

## 2024-09-30 LAB
ALBUMIN SERPL-MCNC: 3.9 G/DL (ref 3.4–4.8)
ALBUMIN/GLOB SERPL: 1.3 RATIO (ref 1.1–2)
ALP SERPL-CCNC: 62 UNIT/L (ref 40–150)
ALT SERPL-CCNC: 19 UNIT/L (ref 0–55)
ANION GAP SERPL CALC-SCNC: 8 MEQ/L
AST SERPL-CCNC: 22 UNIT/L (ref 5–34)
BASOPHILS # BLD AUTO: 0.04 X10(3)/MCL
BASOPHILS NFR BLD AUTO: 0.2 %
BILIRUB SERPL-MCNC: 0.6 MG/DL
BUN SERPL-MCNC: 18.2 MG/DL (ref 8.4–25.7)
CALCIUM SERPL-MCNC: 9.5 MG/DL (ref 8.8–10)
CHLORIDE SERPL-SCNC: 107 MMOL/L (ref 98–107)
CO2 SERPL-SCNC: 25 MMOL/L (ref 23–31)
CREAT SERPL-MCNC: 1.07 MG/DL (ref 0.73–1.18)
CREAT/UREA NIT SERPL: 17
EOSINOPHIL # BLD AUTO: 0.19 X10(3)/MCL (ref 0–0.9)
EOSINOPHIL NFR BLD AUTO: 0.7 %
ERYTHROCYTE [DISTWIDTH] IN BLOOD BY AUTOMATED COUNT: 13.6 % (ref 11.5–17)
GFR SERPLBLD CREATININE-BSD FMLA CKD-EPI: >60 ML/MIN/1.73/M2
GLOBULIN SER-MCNC: 3.1 GM/DL (ref 2.4–3.5)
GLUCOSE SERPL-MCNC: 106 MG/DL (ref 82–115)
HCT VFR BLD AUTO: 46.7 % (ref 42–52)
HGB BLD-MCNC: 15.8 G/DL (ref 14–18)
IMM GRANULOCYTES # BLD AUTO: 0.06 X10(3)/MCL (ref 0–0.04)
IMM GRANULOCYTES NFR BLD AUTO: 0.2 %
LDH SERPL-CCNC: 269 U/L (ref 125–220)
LYMPHOCYTES # BLD AUTO: 18.48 X10(3)/MCL (ref 0.6–4.6)
LYMPHOCYTES NFR BLD AUTO: 71.9 %
MCH RBC QN AUTO: 32.8 PG (ref 27–31)
MCHC RBC AUTO-ENTMCNC: 33.8 G/DL (ref 33–36)
MCV RBC AUTO: 97.1 FL (ref 80–94)
MONOCYTES # BLD AUTO: 0.63 X10(3)/MCL (ref 0.1–1.3)
MONOCYTES NFR BLD AUTO: 2.4 %
NEUTROPHILS # BLD AUTO: 6.32 X10(3)/MCL (ref 2.1–9.2)
NEUTROPHILS NFR BLD AUTO: 24.6 %
PLATELET # BLD AUTO: 229 X10(3)/MCL (ref 130–400)
PMV BLD AUTO: 9.6 FL (ref 7.4–10.4)
POTASSIUM SERPL-SCNC: 4.1 MMOL/L (ref 3.5–5.1)
PROT SERPL-MCNC: 7 GM/DL (ref 5.8–7.6)
RBC # BLD AUTO: 4.81 X10(6)/MCL (ref 4.7–6.1)
SODIUM SERPL-SCNC: 140 MMOL/L (ref 136–145)
WBC # BLD AUTO: 25.72 X10(3)/MCL (ref 4.5–11.5)

## 2024-09-30 PROCEDURE — 83615 LACTATE (LD) (LDH) ENZYME: CPT

## 2024-09-30 PROCEDURE — 80053 COMPREHEN METABOLIC PANEL: CPT

## 2024-09-30 PROCEDURE — 36415 COLL VENOUS BLD VENIPUNCTURE: CPT

## 2024-09-30 PROCEDURE — 85025 COMPLETE CBC W/AUTO DIFF WBC: CPT

## 2024-09-30 NOTE — PROGRESS NOTES
Subjective:       Patient ID: Javier De Santiago is a 79 y.o. male.    PCP: Dr. Mick Lorenzana    Chronic Lymphocytic Leukemia--Diagnosed 5/9/24  Work-up:  Flow cytometry on peripheral blood done 5/9/24--findings c/w CLL, kappa light chain restricted.  FISH CLL panel done 6/5/24--11q deletion present in 33% of cells, associated with shorter time to first treatment in previously untreated patients.   Work-up 6/5/24--Immunoglobulins normal, Beta-2 microglobulin mildly elevated 2.73.     Current treatment: Observation    Chief Complaint: Other Misc (Pt reports no concerns today.)    HPI  Patient presents for follow-up of CLL. WBC slightly increased since 6/2024, but improved since last month. Since his last visit, he was found to be bradycardic and had to undergo pacemaker placement. He was also noted to be in Afib and underwent cardioversion with Dr. Watts. He is now on Eliquis. Still has the fatigue. Still smoking his pipes with no plans to quit. He has ongoing issues with his sinuses.    Past Medical History:   Diagnosis Date    Class 1 obesity due to excess calories with serious comorbidity and body mass index (BMI) of 30.0 to 30.9 in adult 1/22/2023    Fatigue     H/O colectomy     H/O ileostomy     Hyperlipidemia       Past Surgical History:   Procedure Laterality Date    A-V CARDIAC PACEMAKER INSERTION N/A 9/12/2024    Procedure: INSERTION, CARDIAC PACEMAKER, DUAL CHAMBER;  Surgeon: Guille Watts MD;  Location: [facility] CATH LAB;  Service: Cardiology;  Laterality: N/A;  DUAL CHAMBER PPM W/ LBB LEAD (MDT)    COLECTOMY      CYSTO, RETROGRADE PYELOGRAM,BALLOON DILATION, STENT PLACEMENT  01/24/2020    ILEOSTOMY      PHACOEMULSIFICATION, CATARACT, WITH IOL INSERTION Left 2/9/2024    Procedure: PHACOEMULSIFICATION, CATARACT, WITH IOL INSERTION- OS;  Surgeon: Ousmane Cho MD;  Location: [facility] OR;  Service: Ophthalmology;  Laterality: Left;    PHACOEMULSIFICATION, CATARACT, WITH IOL INSERTION Right 3/19/2024    Procedure:  "PHACOEMULSIFICATION, CATARACT, WITH IOL INSERTION- OD;  Surgeon: Ousmane Cho MD;  Location: [facility] OR;  Service: Ophthalmology;  Laterality: Right;    TONSILLECTOMY       Family History   Problem Relation Name Age of Onset    Dementia Mother      Heart failure Father       Social History     Socioeconomic History    Marital status:    Tobacco Use    Smoking status: Every Day     Types: Pipe    Smokeless tobacco: Never    Tobacco comments:     A pipe a day keeps the ...   Substance and Sexual Activity    Drug use: Never       Review of patient's allergies indicates:  No Known Allergies   Current Outpatient Medications on File Prior to Visit   Medication Sig Dispense Refill    apixaban (ELIQUIS) 5 mg Tab Take 1 tablet (5 mg total) by mouth 2 (two) times daily. 60 tablet 3    fluticasone propionate (FLONASE) 50 mcg/actuation nasal spray 1 spray by Each Nostril route once daily.      potassium citrate (UROCIT-K) 10 mEq (1,080 mg) TbSR Take 10 mEq by mouth 3 (three) times daily.       No current facility-administered medications on file prior to visit.      Review of Systems   Constitutional:  Positive for fatigue. Negative for appetite change and unexpected weight change.   HENT:  Positive for nasal congestion. Negative for mouth sores.    Eyes:  Negative for visual disturbance.   Respiratory:  Negative for cough and shortness of breath.    Cardiovascular:  Negative for chest pain.   Gastrointestinal:  Negative for abdominal pain and diarrhea.   Genitourinary:  Negative for frequency.   Musculoskeletal:  Negative for back pain.   Integumentary:  Negative for rash.   Neurological:  Negative for headaches.   Hematological:  Negative for adenopathy.   Psychiatric/Behavioral:  The patient is not nervous/anxious.             Vitals:    10/02/24 0943   BP: 121/66   Pulse: 76   Resp: 14   Temp: 98.1 °F (36.7 °C)   TempSrc: Oral   SpO2: 96%   Weight: 81.1 kg (178 lb 11.2 oz)   Height: 5' 6" (1.676 m)        Physical " Exam  Constitutional:       General: He is awake.      Appearance: Normal appearance.   HENT:      Head: Normocephalic and atraumatic.      Nose: Nose normal.      Mouth/Throat:      Mouth: Mucous membranes are moist.   Eyes:      General: Vision grossly intact.      Extraocular Movements: Extraocular movements intact.      Conjunctiva/sclera: Conjunctivae normal.   Cardiovascular:      Rate and Rhythm: Normal rate and regular rhythm.      Heart sounds: Normal heart sounds.   Pulmonary:      Effort: Pulmonary effort is normal.      Comments: Decreased breath sounds bilaterally  Abdominal:      General: Bowel sounds are normal. There is no distension.      Palpations: Abdomen is soft.      Tenderness: There is no abdominal tenderness.   Musculoskeletal:      Cervical back: Normal range of motion and neck supple.      Right lower leg: No edema.      Left lower leg: No edema.   Lymphadenopathy:      Cervical: No cervical adenopathy.      Upper Body:      Right upper body: No supraclavicular adenopathy.      Left upper body: No supraclavicular adenopathy.      Comments: Shotty palpable lymph nodes bilateral axillae   Skin:     General: Skin is warm.   Neurological:      Mental Status: He is alert and oriented to person, place, and time.      Motor: Motor function is intact.   Psychiatric:         Mood and Affect: Mood normal.         Speech: Speech normal.         Behavior: Behavior is cooperative.         Judgment: Judgment normal.       Lab Visit on 09/30/2024   Component Date Value    Sodium 09/30/2024 140     Potassium 09/30/2024 4.1     Chloride 09/30/2024 107     CO2 09/30/2024 25     Glucose 09/30/2024 106     Blood Urea Nitrogen 09/30/2024 18.2     Creatinine 09/30/2024 1.07     Calcium 09/30/2024 9.5     Protein Total 09/30/2024 7.0     Albumin 09/30/2024 3.9     Globulin 09/30/2024 3.1     Albumin/Globulin Ratio 09/30/2024 1.3     Bilirubin Total 09/30/2024 0.6     ALP 09/30/2024 62     ALT 09/30/2024 19      AST 09/30/2024 22     eGFR 09/30/2024 >60     Anion Gap 09/30/2024 8.0     BUN/Creatinine Ratio 09/30/2024 17     Lactate Dehydrogenase 09/30/2024 269 (H)     WBC 09/30/2024 25.72 (H)     RBC 09/30/2024 4.81     Hgb 09/30/2024 15.8     Hct 09/30/2024 46.7     MCV 09/30/2024 97.1 (H)     MCH 09/30/2024 32.8 (H)     MCHC 09/30/2024 33.8     RDW 09/30/2024 13.6     Platelet 09/30/2024 229     MPV 09/30/2024 9.6     Neut % 09/30/2024 24.6     Lymph % 09/30/2024 71.9     Mono % 09/30/2024 2.4     Eos % 09/30/2024 0.7     Basophil % 09/30/2024 0.2     Lymph # 09/30/2024 18.48 (H)     Neut # 09/30/2024 6.32     Mono # 09/30/2024 0.63     Eos # 09/30/2024 0.19     Baso # 09/30/2024 0.04     IG# 09/30/2024 0.06 (H)     IG% 09/30/2024 0.2    Admission on 09/12/2024, Discharged on 09/12/2024   Component Date Value    QRS Duration 09/12/2024 116     OHS QTC Calculation 09/12/2024 441     WBC 09/12/2024 31.89 (H)     RBC 09/12/2024 4.68 (L)     Hgb 09/12/2024 15.2     Hct 09/12/2024 45.0     MCV 09/12/2024 96.2 (H)     MCH 09/12/2024 32.5 (H)     MCHC 09/12/2024 33.8     RDW 09/12/2024 13.8     Platelet 09/12/2024 201     MPV 09/12/2024 9.8     Neut % 09/12/2024 24.7     Lymph % 09/12/2024 71.5     Mono % 09/12/2024 2.4     Eos % 09/12/2024 0.7     Basophil % 09/12/2024 0.3     Lymph # 09/12/2024 22.79 (H)     Neut # 09/12/2024 7.90     Mono # 09/12/2024 0.76     Eos # 09/12/2024 0.21     Baso # 09/12/2024 0.09     IG# 09/12/2024 0.14 (H)     IG% 09/12/2024 0.4     NRBC% 09/12/2024 0.0     QRS Duration 09/12/2024 136     OHS QTC Calculation 09/12/2024 466    Admission on 08/12/2024, Discharged on 08/13/2024   Component Date Value    PTT 08/12/2024 25.8     Sodium 08/12/2024 137     Potassium 08/12/2024 4.2     Chloride 08/12/2024 102     CO2 08/12/2024 25     Glucose 08/12/2024 116 (H)     Blood Urea Nitrogen 08/12/2024 15.7     Creatinine 08/12/2024 1.17     Calcium 08/12/2024 9.8     Protein Total 08/12/2024 7.3     Albumin  08/12/2024 4.0     Globulin 08/12/2024 3.3     Albumin/Globulin Ratio 08/12/2024 1.2     Bilirubin Total 08/12/2024 0.8     ALP 08/12/2024 58     ALT 08/12/2024 22     AST 08/12/2024 24     eGFR 08/12/2024 >60     Anion Gap 08/12/2024 10.0     BUN/Creatinine Ratio 08/12/2024 13     Troponin-I 08/12/2024 0.013     Magnesium Level 08/12/2024 1.90     PT 08/12/2024 13.6     INR 08/12/2024 1.0     QRS Duration 08/12/2024 92     OHS QTC Calculation 08/12/2024 396     WBC 08/12/2024 26.67 (H)     RBC 08/12/2024 4.99     Hgb 08/12/2024 16.1     Hct 08/12/2024 48.4     MCV 08/12/2024 97.0 (H)     MCH 08/12/2024 32.3 (H)     MCHC 08/12/2024 33.3     RDW 08/12/2024 13.9     Platelet 08/12/2024 201     MPV 08/12/2024 10.6 (H)     NRBC% 08/12/2024 0.0     IPF 08/12/2024 3.6     TSH 08/12/2024 1.714     WBC 08/12/2024 26.67     Neutrophils % 08/12/2024 25     Lymphs % 08/12/2024 73     Monocytes % 08/12/2024 1     Eosinophils % 08/12/2024 1     Neutrophils Abs 08/12/2024 6.6675     Lymphs Abs 08/12/2024 19.4691 (H)     Monocytes Abs 08/12/2024 0.2667     Eosinophils Abs 08/12/2024 0.2667     Platelets 08/12/2024 Normal     RBC Morph 08/12/2024 Normal     Peripheral Smear Evaluat* 08/12/2024 - Leukocytosis with absolute lymphocytosis consistent with patient's history of CLL/SLL.    Gomez Pool M.D.      QRS Duration 08/13/2024 104     OHS QTC Calculation 08/13/2024 395     Sodium 08/13/2024 135 (L)     Potassium 08/13/2024 4.5     Chloride 08/13/2024 107     CO2 08/13/2024 22 (L)     Glucose 08/13/2024 119 (H)     Blood Urea Nitrogen 08/13/2024 19.4     Creatinine 08/13/2024 1.10     Calcium 08/13/2024 8.9     Protein Total 08/13/2024 5.8     Albumin 08/13/2024 3.2 (L)     Globulin 08/13/2024 2.6     Albumin/Globulin Ratio 08/13/2024 1.2     Bilirubin Total 08/13/2024 0.5     ALP 08/13/2024 45     ALT 08/13/2024 18     AST 08/13/2024 21     eGFR 08/13/2024 >60     Anion Gap 08/13/2024 6.0     BUN/Creatinine Ratio  08/13/2024 18     Magnesium Level 08/13/2024 1.90     WBC 08/13/2024 23.94 (H)     RBC 08/13/2024 4.29 (L)     Hgb 08/13/2024 14.0     Hct 08/13/2024 41.8 (L)     MCV 08/13/2024 97.4 (H)     MCH 08/13/2024 32.6 (H)     MCHC 08/13/2024 33.5     RDW 08/13/2024 13.8     Platelet 08/13/2024 168     MPV 08/13/2024 10.2     Neut % 08/13/2024 30.1     Lymph % 08/13/2024 64.7     Mono % 08/13/2024 3.9     Eos % 08/13/2024 0.8     Basophil % 08/13/2024 0.2     Lymph # 08/13/2024 15.48 (H)     Neut # 08/13/2024 7.22     Mono # 08/13/2024 0.93     Eos # 08/13/2024 0.18     Baso # 08/13/2024 0.05     IG# 08/13/2024 0.08 (H)     IG% 08/13/2024 0.3     NRBC% 08/13/2024 0.0     IPF 08/13/2024 3.6          Assessment:       1. CLL (chronic lymphocytic leukemia)        Plan:       Patient with CLL, diagnosed in 5/2024.  FISH with 11q deletion which can be associated with shorter time to treatment.       WBC remains mildly elevated 25K, H/H and platelets normal.  No B-symptoms.  Of note, recently diagnosed with Afib s/p cardioversion and also bradycardia s/p pacemaker placement with Dr. Watts. Now on Eliquis.    Plan for continued observation for now.  If his WBC increases further, may plan for baseline CT scan.     F/u in 3 months with repeat labs.  When patient needs treatment, recommended treatment would be BTK inhibitors due to the 11q deletion. Would need to be the lowest risk for Afib.     All questions answered at this time.    Sherry Watson MD         Addendum:  Obtained records from [facility], patient noted to have severe 1st degree AV block, sinus bradycardia and persistent Aflutter.   Underwent cardioversion, pacemaker placement 9/12/24.     Sherry Watson MD

## 2024-10-02 ENCOUNTER — OFFICE VISIT (OUTPATIENT)
Dept: HEMATOLOGY/ONCOLOGY | Facility: CLINIC | Age: 79
End: 2024-10-02
Payer: MEDICARE

## 2024-10-02 VITALS
DIASTOLIC BLOOD PRESSURE: 66 MMHG | TEMPERATURE: 98 F | HEART RATE: 76 BPM | OXYGEN SATURATION: 96 % | BODY MASS INDEX: 28.72 KG/M2 | RESPIRATION RATE: 14 BRPM | WEIGHT: 178.69 LBS | HEIGHT: 66 IN | SYSTOLIC BLOOD PRESSURE: 121 MMHG

## 2024-10-02 DIAGNOSIS — C91.10 CLL (CHRONIC LYMPHOCYTIC LEUKEMIA): Primary | Chronic | ICD-10-CM

## 2024-10-02 PROCEDURE — 3288F FALL RISK ASSESSMENT DOCD: CPT | Mod: CPTII,S$GLB,, | Performed by: INTERNAL MEDICINE

## 2024-10-02 PROCEDURE — 99999 PR PBB SHADOW E&M-EST. PATIENT-LVL III: CPT | Mod: PBBFAC,,, | Performed by: INTERNAL MEDICINE

## 2024-10-02 PROCEDURE — 3078F DIAST BP <80 MM HG: CPT | Mod: CPTII,S$GLB,, | Performed by: INTERNAL MEDICINE

## 2024-10-02 PROCEDURE — 1101F PT FALLS ASSESS-DOCD LE1/YR: CPT | Mod: CPTII,S$GLB,, | Performed by: INTERNAL MEDICINE

## 2024-10-02 PROCEDURE — 3074F SYST BP LT 130 MM HG: CPT | Mod: CPTII,S$GLB,, | Performed by: INTERNAL MEDICINE

## 2024-10-02 PROCEDURE — 1159F MED LIST DOCD IN RCRD: CPT | Mod: CPTII,S$GLB,, | Performed by: INTERNAL MEDICINE

## 2024-10-02 PROCEDURE — 1160F RVW MEDS BY RX/DR IN RCRD: CPT | Mod: CPTII,S$GLB,, | Performed by: INTERNAL MEDICINE

## 2024-10-02 PROCEDURE — 1126F AMNT PAIN NOTED NONE PRSNT: CPT | Mod: CPTII,S$GLB,, | Performed by: INTERNAL MEDICINE

## 2024-10-02 PROCEDURE — 99214 OFFICE O/P EST MOD 30 MIN: CPT | Mod: S$GLB,,, | Performed by: INTERNAL MEDICINE

## 2025-01-02 ENCOUNTER — LAB VISIT (OUTPATIENT)
Dept: LAB | Facility: HOSPITAL | Age: 80
End: 2025-01-02
Attending: INTERNAL MEDICINE
Payer: MEDICARE

## 2025-01-02 DIAGNOSIS — C91.10 CLL (CHRONIC LYMPHOCYTIC LEUKEMIA): ICD-10-CM

## 2025-01-02 LAB
ALBUMIN SERPL-MCNC: 4.1 G/DL (ref 3.4–4.8)
ALBUMIN/GLOB SERPL: 1.1 RATIO (ref 1.1–2)
ALP SERPL-CCNC: 60 UNIT/L (ref 40–150)
ALT SERPL-CCNC: 19 UNIT/L (ref 0–55)
ANION GAP SERPL CALC-SCNC: 11 MEQ/L
AST SERPL-CCNC: 25 UNIT/L (ref 5–34)
BILIRUB SERPL-MCNC: 0.8 MG/DL
BUN SERPL-MCNC: 17.6 MG/DL (ref 8.4–25.7)
CALCIUM SERPL-MCNC: 9.9 MG/DL (ref 8.8–10)
CHLORIDE SERPL-SCNC: 103 MMOL/L (ref 98–107)
CO2 SERPL-SCNC: 24 MMOL/L (ref 23–31)
CREAT SERPL-MCNC: 1.18 MG/DL (ref 0.72–1.25)
CREAT/UREA NIT SERPL: 15
GFR SERPLBLD CREATININE-BSD FMLA CKD-EPI: >60 ML/MIN/1.73/M2
GLOBULIN SER-MCNC: 3.6 GM/DL (ref 2.4–3.5)
GLUCOSE SERPL-MCNC: 101 MG/DL (ref 82–115)
LDH SERPL-CCNC: 473 U/L (ref 125–220)
POTASSIUM SERPL-SCNC: 4.7 MMOL/L (ref 3.5–5.1)
PROT SERPL-MCNC: 7.7 GM/DL (ref 5.8–7.6)
SODIUM SERPL-SCNC: 138 MMOL/L (ref 136–145)

## 2025-01-02 PROCEDURE — 81263 IGH VARI REGIONAL MUTATION: CPT

## 2025-01-02 PROCEDURE — 83615 LACTATE (LD) (LDH) ENZYME: CPT

## 2025-01-02 PROCEDURE — 85007 BL SMEAR W/DIFF WBC COUNT: CPT

## 2025-01-02 PROCEDURE — 36415 COLL VENOUS BLD VENIPUNCTURE: CPT

## 2025-01-02 PROCEDURE — 80053 COMPREHEN METABOLIC PANEL: CPT

## 2025-01-03 LAB
ABS NEUT CALC (OHS): 6.67 X10(3)/MCL (ref 2.1–9.2)
ERYTHROCYTE [DISTWIDTH] IN BLOOD BY AUTOMATED COUNT: 12.9 % (ref 11.5–17)
HCT VFR BLD AUTO: 49.4 % (ref 42–52)
HGB BLD-MCNC: 16.6 G/DL (ref 14–18)
LYMPHOCYTES NFR BLD MANUAL: 14.9 X10(3)/MCL
LYMPHOCYTES NFR BLD MANUAL: 67 % (ref 13–40)
MCH RBC QN AUTO: 32.7 PG (ref 27–31)
MCHC RBC AUTO-ENTMCNC: 33.6 G/DL (ref 33–36)
MCV RBC AUTO: 97.2 FL (ref 80–94)
MONOCYTES NFR BLD MANUAL: 0.67 X10(3)/MCL (ref 0.1–1.3)
MONOCYTES NFR BLD MANUAL: 3 % (ref 2–11)
NEUTROPHILS NFR BLD MANUAL: 30 % (ref 47–80)
PLATELET # BLD AUTO: 174 X10(3)/MCL (ref 130–400)
PLATELET # BLD EST: NORMAL 10*3/UL
PMV BLD AUTO: 9.8 FL (ref 7.4–10.4)
RBC # BLD AUTO: 5.08 X10(6)/MCL (ref 4.7–6.1)
RBC MORPH BLD: NORMAL
WBC # BLD AUTO: 22.24 X10(3)/MCL (ref 4.5–11.5)

## 2025-01-13 PROBLEM — D72.0 GENETIC ANOMALIES OF LEUKOCYTES: Status: ACTIVE | Noted: 2025-01-13

## 2025-01-13 NOTE — PROGRESS NOTES
Subjective:       Patient ID: Javier De Santiago is a 79 y.o. male.    PCP: Dr. Mick Lorenzana    Chronic Lymphocytic Leukemia--Diagnosed 5/9/24  Work-up:  Flow cytometry on peripheral blood done 5/9/24--findings c/w CLL, kappa light chain restricted.  FISH CLL panel done 6/5/24--11q deletion present in 33% of cells, associated with shorter time to first treatment in previously untreated patients.   Work-up 6/5/24--Immunoglobulins normal, Beta-2 microglobulin mildly elevated 2.73.     Current treatment: Observation    Chief Complaint: Fatigue    HPI  Patient presents for follow-up of CLL. He is doing well. His wife is with him today. Recent labs show stable/improved WBC at 22.24. He admits to having viral illness last week but has since recovered. Denies any fevers, night sweats or weight loss. Appetite good and weight stable. His biggest complaint today is fatigue but this is not new. Continues with close follow-up with cardiology. He is now on Eliquis for atrial fib. Still smoking his pipes with no plans to quit. He has ongoing issues with his sinuses. No other problems reported today.     Past Medical History:   Diagnosis Date    Class 1 obesity due to excess calories with serious comorbidity and body mass index (BMI) of 30.0 to 30.9 in adult 1/22/2023    Fatigue     H/O colectomy     H/O ileostomy     Hyperlipidemia       Past Surgical History:   Procedure Laterality Date    A-V CARDIAC PACEMAKER INSERTION N/A 9/12/2024    Procedure: INSERTION, CARDIAC PACEMAKER, DUAL CHAMBER;  Surgeon: Guille Watts MD;  Location: [facility] CATH LAB;  Service: Cardiology;  Laterality: N/A;  DUAL CHAMBER PPM W/ LBB LEAD (MDT)    COLECTOMY      CYSTO, RETROGRADE PYELOGRAM,BALLOON DILATION, STENT PLACEMENT  01/24/2020    ILEOSTOMY      PHACOEMULSIFICATION, CATARACT, WITH IOL INSERTION Left 2/9/2024    Procedure: PHACOEMULSIFICATION, CATARACT, WITH IOL INSERTION- OS;  Surgeon: Ousmane Cho MD;  Location: [facility] OR;  Service: Ophthalmology;   Laterality: Left;    PHACOEMULSIFICATION, CATARACT, WITH IOL INSERTION Right 3/19/2024    Procedure: PHACOEMULSIFICATION, CATARACT, WITH IOL INSERTION- OD;  Surgeon: Ousmane Cho MD;  Location: [facility];  Service: Ophthalmology;  Laterality: Right;    TONSILLECTOMY       Family History   Problem Relation Name Age of Onset    Dementia Mother      Heart failure Father       Social History     Socioeconomic History    Marital status:    Tobacco Use    Smoking status: Every Day     Types: Pipe    Smokeless tobacco: Never    Tobacco comments:     A pipe a day keeps the ...   Substance and Sexual Activity    Drug use: Never     Social Drivers of Health     Financial Resource Strain: Low Risk  (1/13/2025)    Overall Financial Resource Strain (CARDIA)     Difficulty of Paying Living Expenses: Not hard at all   Food Insecurity: No Food Insecurity (1/13/2025)    Hunger Vital Sign     Worried About Running Out of Food in the Last Year: Never true     Ran Out of Food in the Last Year: Never true   Physical Activity: Inactive (1/13/2025)    Exercise Vital Sign     Days of Exercise per Week: 0 days     Minutes of Exercise per Session: 0 min   Stress: No Stress Concern Present (1/13/2025)    [employer] - Occupational Stress Questionnaire     Feeling of Stress : Only a little   Housing Stability: Unknown (1/13/2025)    Housing Stability Vital Sign     Unable to Pay for Housing in the Last Year: No       Review of patient's allergies indicates:  No Known Allergies   Current Outpatient Medications on File Prior to Visit   Medication Sig Dispense Refill    apixaban (ELIQUIS) 5 mg Tab Take 1 tablet (5 mg total) by mouth 2 (two) times daily. 60 tablet 3    fluticasone propionate (FLONASE) 50 mcg/actuation nasal spray 1 spray by Each Nostril route once daily.      neomycin-bacitracin-polymyxin (POLYSPORIN) ophthalmic ointment Place into the right eye 4 (four) times daily. for 10 days 3.5 g 1     "potassium citrate (UROCIT-K) 10 mEq (1,080 mg) TbSR Take 10 mEq by mouth 3 (three) times daily.      rosuvastatin (CRESTOR) 5 MG tablet Take 5 mg by mouth once daily.       Current Facility-Administered Medications on File Prior to Visit   Medication Dose Route Frequency Provider Last Rate Last Admin    [COMPLETED] betamethasone acetate-betamethasone sodium phosphate injection 9 mg  9 mg Intramuscular 1 time in Clinic/HOD    9 mg at 01/15/25 1352      Review of Systems   Constitutional:  Positive for fatigue. Negative for appetite change and unexpected weight change.   HENT:  Negative for mouth sores.         Chronic sinus issues   Eyes:  Negative for visual disturbance.   Respiratory:  Negative for cough and shortness of breath.    Cardiovascular:  Negative for chest pain.   Gastrointestinal:  Negative for abdominal pain and diarrhea.   Genitourinary:  Negative for frequency.   Musculoskeletal:  Negative for back pain.   Integumentary:  Negative for rash.   Neurological:  Negative for headaches.   Hematological:  Negative for adenopathy.        Vitals:    01/16/25 1336   BP: (!) 154/79   Pulse: 80   Resp: 15   Temp: 97.8 °F (36.6 °C)   SpO2: 97%   Weight: 82.1 kg (181 lb)   Height: 5' 6" (1.676 m)     Physical Exam  Constitutional:       General: He is awake.      Appearance: Normal appearance. He is normal weight.   HENT:      Head: Normocephalic and atraumatic.      Nose: Nose normal.      Mouth/Throat:      Mouth: Mucous membranes are moist.   Eyes:      General: Vision grossly intact.      Extraocular Movements: Extraocular movements intact.      Conjunctiva/sclera: Conjunctivae normal.   Neck:      Comments: Several small palpable cervical lymph nodes. None are pathologically enlarged.  Cardiovascular:      Rate and Rhythm: Normal rate and regular rhythm.      Heart sounds: Normal heart sounds.   Pulmonary:      Effort: Pulmonary effort is normal.      Comments: Decreased breath sounds bilaterally  Abdominal: "      General: Bowel sounds are normal. There is no distension.      Palpations: Abdomen is soft.      Tenderness: There is no abdominal tenderness.   Musculoskeletal:      Cervical back: Normal range of motion and neck supple.      Right lower leg: No edema.      Left lower leg: No edema.   Lymphadenopathy:      Cervical: Cervical adenopathy present.      Upper Body:      Right upper body: No supraclavicular adenopathy.      Left upper body: No supraclavicular adenopathy.      Comments: Shotty palpable lymph nodes bilateral axillae - unable to palpate today.   Skin:     General: Skin is warm.   Neurological:      Mental Status: He is alert and oriented to person, place, and time.      Motor: Motor function is intact.   Psychiatric:         Mood and Affect: Mood normal.         Speech: Speech normal.         Behavior: Behavior is cooperative.         Judgment: Judgment normal.       Lab Visit on 01/02/2025   Component Date Value    Sodium 01/02/2025 138     Potassium 01/02/2025 4.7     Chloride 01/02/2025 103     CO2 01/02/2025 24     Glucose 01/02/2025 101     Blood Urea Nitrogen 01/02/2025 17.6     Creatinine 01/02/2025 1.18     Calcium 01/02/2025 9.9     Protein Total 01/02/2025 7.7 (H)     Albumin 01/02/2025 4.1     Globulin 01/02/2025 3.6 (H)     Albumin/Globulin Ratio 01/02/2025 1.1     Bilirubin Total 01/02/2025 0.8     ALP 01/02/2025 60     ALT 01/02/2025 19     AST 01/02/2025 25     eGFR 01/02/2025 >60     Anion Gap 01/02/2025 11.0     BUN/Creatinine Ratio 01/02/2025 15     Lactate Dehydrogenase 01/02/2025 473 (H)     BCLL Result 01/02/2025 see interpretation     Specimen Type 01/02/2025 Blood     Final Diagnosis 01/02/2025 SEE COMMENTS     WBC 01/02/2025 22.24 (H)     RBC 01/02/2025 5.08     Hgb 01/02/2025 16.6     Hct 01/02/2025 49.4     MCV 01/02/2025 97.2 (H)     MCH 01/02/2025 32.7 (H)     MCHC 01/02/2025 33.6     RDW 01/02/2025 12.9     Platelet 01/02/2025 174     MPV 01/02/2025 9.8     Neutrophils %  01/02/2025 30 (L)     Lymphs % 01/02/2025 67 (H)     Monocytes % 01/02/2025 3     Neutrophils Abs Calc 01/02/2025 6.672     Lymphs Abs 01/02/2025 14.9008 (H)     Monocytes Abs 01/02/2025 0.6672     Platelets 01/02/2025 Normal     RBC Morph 01/02/2025 Normal          Assessment:       1. CLL (chronic lymphocytic leukemia)    2. Vitamin D deficiency    3. Genetic anomalies of leukocytes    4. Ileostomy present        Plan:       Patient with CLL, diagnosed in 5/2024.  FISH with 11q deletion which can be associated with shorter time to treatment.     Obtained records from [facility], patient noted to have severe 1st degree AV block, sinus bradycardia and persistent Aflutter.   Underwent cardioversion, pacemaker placement 9/12/24.   WBC remains mildly elevated 22K, H/H and platelets normal. This has improved slightly.   No B-symptoms.  CMP good.   LDH remains elevated, increased from previous but he did have a viral illness (flu) last week which can explain the increase.   Plan for continued observation for now.  If his WBC increases further, may plan for baseline CT scan.   F/u in 3 months with repeat labs.    When patient needs treatment, recommended treatment would be BTK inhibitors due to the 11q deletion. Would need to be the lowest risk for Afib.   All questions answered at this time.      Juan Alberto Trujillo, [facility]      Visit today included increased complexity associated with the longitudinal care of the patient's chronic CLL diagnosis.

## 2025-01-14 PROBLEM — R73.9 BORDERLINE HYPERGLYCEMIA: Status: ACTIVE | Noted: 2025-01-14

## 2025-01-14 NOTE — PROGRESS NOTES
Subjective:       Patient ID: Javier De Santiago is a 79 y.o. male.    Chief Complaint: Hyperlipidemia and Follow-up    Patient presents to the clinic today for chronic condition follow-up.  Doing well, no specific complaints, tolerating all medications, reporting no significant side effects or problems.    Last wellness exam was 05/09/2024.    Chronic conditions being treated include but are not limited to paroxysmal AFib, dyslipidemia, atrial flutter, CLL, history of colon resection with ileostomy.    He does have what appears to be an infected stye, right upper eyelid, and has developed spontaneous left shoulder pain, starting early December 2024 without known cause.  No history of previous shoulder problems.  No apparent injury.      Review of Systems   Constitutional: Negative.  Negative for fatigue and fever.   HENT: Negative.  Negative for sore throat and trouble swallowing.    Eyes:  Negative for redness and visual disturbance.        Right upper eyelid stye   Respiratory: Negative.  Negative for chest tightness and shortness of breath.    Cardiovascular: Negative.  Negative for chest pain.   Gastrointestinal: Negative.  Negative for abdominal pain, blood in stool and nausea.   Endocrine: Negative.  Negative for cold intolerance, heat intolerance and polyuria.   Genitourinary: Negative.    Musculoskeletal: Negative.  Negative for arthralgias (Left shoulder pain), back pain, gait problem, joint swelling and neck pain.   Integumentary:  Negative for rash. Negative.   Neurological: Negative.  Negative for dizziness, weakness and headaches.   Psychiatric/Behavioral: Negative.  Negative for agitation and dysphoric mood.            Patient Care Team:  Mick Lorenzana MD as PCP - General (Family Medicine)  Jatin Regan MD as Consulting Physician (Urology)  Ousmane Cho MD as Consulting Physician (Ophthalmology)  Benoit Vincent MD as Consulting Physician (Cardiology)  Guille Watts MD as Consulting Physician  "(Cardiology)  Objective:   Visit Vitals  /89   Pulse 84   Resp 18   Ht 5' 6" (1.676 m)   Wt 81.6 kg (180 lb)   SpO2 99%   BMI 29.05 kg/m²        Physical Exam  Constitutional:       Appearance: Normal appearance.   HENT:      Head: Normocephalic.      Mouth/Throat:      Mouth: Mucous membranes are moist.      Pharynx: Oropharynx is clear.   Eyes:      Conjunctiva/sclera: Conjunctivae normal.      Pupils: Pupils are equal, round, and reactive to light.      Comments: Infected stye, right mid upper eyelid.   Cardiovascular:      Rate and Rhythm: Normal rate and regular rhythm.      Heart sounds: Normal heart sounds.   Pulmonary:      Effort: Pulmonary effort is normal.      Breath sounds: Normal breath sounds.   Abdominal:      General: Abdomen is flat.      Palpations: Abdomen is soft.   Musculoskeletal:      Cervical back: Neck supple.      Comments: No external left shoulder pain to palpation, decreased range of motion, has trouble lifting his left arm laterally and anteriorly above horizontal.   Skin:     General: Skin is warm and dry.   Neurological:      General: No focal deficit present.      Mental Status: He is alert and oriented to person, place, and time.   Psychiatric:         Mood and Affect: Mood normal.         Behavior: Behavior normal.         Thought Content: Thought content normal.         Judgment: Judgment normal.           Lab Results   Component Value Date     01/02/2025    K 4.7 01/02/2025     01/02/2025    CO2 24 01/02/2025    BUN 17.6 01/02/2025    CREATININE 1.18 01/02/2025    CALCIUM 9.9 01/02/2025    ALBUMIN 4.1 01/02/2025    BILITOT 0.8 01/02/2025    ALKPHOS 60 01/02/2025    AST 25 01/02/2025    ALT 19 01/02/2025    EGFRNORACEVR >60 01/02/2025     Lab Results   Component Value Date    WBC 22.24 (H) 01/02/2025    RBC 5.08 01/02/2025    HGB 16.6 01/02/2025    HCT 49.4 01/02/2025    MCV 97.2 (H) 01/02/2025    RDW 12.9 01/02/2025     01/02/2025      Lab Results "   Component Value Date    CHOL 197 05/01/2024    TRIG 238 (H) 05/01/2024    HDL 47 05/01/2024    .00 05/01/2024    TOTALCHOLEST 4 05/01/2024     Lab Results   Component Value Date    TSH 1.714 08/12/2024     Lab Results   Component Value Date    HGBA1C 5.5 05/01/2024        Lab Results   Component Value Date    PSA 0.92 05/01/2024         Assessment:       ICD-10-CM ICD-9-CM   1. Paroxysmal atrial fibrillation  I48.0 427.31   2. Dyslipidemia  E78.5 272.4   3. CLL (chronic lymphocytic leukemia)  C91.10 204.10   4. 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 S/P colon resection  Z90.49 V45.89   6. Ileostomy present  Z93.2 V44.2   7. Atrial flutter, unspecified type  I48.92 427.32   8. Nicotine dependence, other tobacco product, uncomplicated  F17.290 305.1   9. Hordeolum externum of right upper eyelid  H00.011 373.11   10. Screening for prostate cancer  Z12.5 V76.44   11. Borderline hyperglycemia  R73.9 790.29   12. Acute pain of left shoulder  M25.512 719.41       Current Outpatient Medications   Medication Instructions    apixaban (ELIQUIS) 5 mg, Oral, 2 times daily    fluticasone propionate (FLONASE) 50 mcg/actuation nasal spray 1 spray, Daily    neomycin-bacitracin-polymyxin (POLYSPORIN) ophthalmic ointment Right Eye, 4 times daily    potassium citrate (UROCIT-K) 10 mEq (1,080 mg) TbSR 10 mEq, 3 times daily    rosuvastatin (CRESTOR) 5 mg, Daily     Plan:     Problem List Items Addressed This Visit          Ophtho    Hordeolum externum of right upper eyelid    Overview     Antibiotic eye ointment will be prescribed.         Relevant Medications    neomycin-bacitracin-polymyxin (POLYSPORIN) ophthalmic ointment       Cardiac/Vascular    Dyslipidemia (Chronic)    Overview     Dyslipidemia is being treated using lifestyle modification only.  Patient is not being prescribed lipid-lowering medication.  As discussed, we will continue to monitor this, and may ultimately choose to prescribe medication if this  becomes difficult to control utilizing lifestyle modification only.         Relevant Orders    Lipid Panel    Atrial flutter (Chronic)    Overview     Documented atrial flutter on EKG dating 08/13/2024.  Sees Dr. Vincent.  Has demand pacemaker.    Patient has a history of cardiac/cardiovascular disease.  Patient does see a cardiologist on a regular basis, cardiovascular condition currently appears to be stable, with no signs of decompensation.  Patient will continue regular cardiology follow-up.         Paroxysmal atrial fibrillation - Primary (Chronic)    Overview     Sees Dr. Vincent, cardiology.  Diagnosed with AFib recently, new onset, takes Eliquis 5 mg b.i.d..  Also, has a demand pacemaker.    Patient has a history of cardiac/cardiovascular disease.  Patient does see a cardiologist on a regular basis, cardiovascular condition currently appears to be stable, with no signs of decompensation.  Patient will continue regular cardiology follow-up.              Oncology    CLL (chronic lymphocytic leukemia) (Chronic)    Overview     From Heme-Onc note, June 2024:    Plan  Patient with CLL, newly diagnosed.  WBC mildly elevated, normal H/H and normal platelets.  Patient has fatigue, but doubt from disease as he has no anemia or thrombocytopenia or other B-symptoms.      No clear indications for any treatment at this time.  Will check LDH, beta-2 microglobulin and FISH CLL panel.     Plan for observation only for now.         Genetic anomalies of leukocytes    Overview     Patient sees Dr. Sherry Watson, hematology chronic lymphocytic leukemia.            Endocrine    Borderline hyperglycemia    Overview     Patient has had elevated blood sugars without overt prediabetes or diabetes, with A1c levels at 5.5-5.6%.  Since patient is at risk for developing prediabetes or diabetes, we will check A1c levels annually.         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            Orthopedic    Acute pain of left shoulder    Overview     Left shoulder pain starting beginning December 2014, no history of shoulder problems, no apparent injury.         Relevant Medications    betamethasone acetate-betamethasone sodium phosphate injection 9 mg (Start on 1/15/2025  2:00 PM)       Other    Nicotine dependence, other tobacco product, uncomplicated (Chronic)    Overview     Cessation of tobacco use in any form was discussed and strongly encouraged.  Assistance offered, information will be provided.  If not ready to quit now, patient will contact this clinic in the future if I can be of any assistance related to the discontinuation of tobacco use/smoking.          Other Visit Diagnoses       Screening for prostate cancer        This diagnosis does not apply to this visit, appropriate prostate cancer screening will be ordered for next visit/wellness exam.                    Follow up in about 6 months (around 7/15/2025) for Medicare Wellness.    This note was created with the assistance of [employer] voice recognition software or phone dictation. There may be transcription errors as a result of using this technology. Effort has been made to assure accuracy of transcription but any obvious errors or omissions should be clarified with the author of the document.

## 2025-01-15 ENCOUNTER — OFFICE VISIT (OUTPATIENT)
Dept: PRIMARY CARE CLINIC | Facility: CLINIC | Age: 80
End: 2025-01-15
Payer: MEDICARE

## 2025-01-15 VITALS
OXYGEN SATURATION: 99 % | HEART RATE: 84 BPM | DIASTOLIC BLOOD PRESSURE: 89 MMHG | BODY MASS INDEX: 28.93 KG/M2 | SYSTOLIC BLOOD PRESSURE: 134 MMHG | WEIGHT: 180 LBS | RESPIRATION RATE: 18 BRPM | HEIGHT: 66 IN

## 2025-01-15 DIAGNOSIS — Z90.49 S/P COLON RESECTION: Chronic | ICD-10-CM

## 2025-01-15 DIAGNOSIS — Z93.2 ILEOSTOMY PRESENT: Chronic | ICD-10-CM

## 2025-01-15 DIAGNOSIS — Z12.5 SCREENING FOR PROSTATE CANCER: ICD-10-CM

## 2025-01-15 DIAGNOSIS — R73.9 BORDERLINE HYPERGLYCEMIA: ICD-10-CM

## 2025-01-15 DIAGNOSIS — H00.011 HORDEOLUM EXTERNUM OF RIGHT UPPER EYELID: ICD-10-CM

## 2025-01-15 DIAGNOSIS — M25.512 ACUTE PAIN OF LEFT SHOULDER: ICD-10-CM

## 2025-01-15 DIAGNOSIS — C91.10 CLL (CHRONIC LYMPHOCYTIC LEUKEMIA): Chronic | ICD-10-CM

## 2025-01-15 DIAGNOSIS — I48.92 ATRIAL FLUTTER, UNSPECIFIED TYPE: Chronic | ICD-10-CM

## 2025-01-15 DIAGNOSIS — D72.0 GENETIC ANOMALIES OF LEUKOCYTES: ICD-10-CM

## 2025-01-15 DIAGNOSIS — F17.290 NICOTINE DEPENDENCE, OTHER TOBACCO PRODUCT, UNCOMPLICATED: Chronic | ICD-10-CM

## 2025-01-15 DIAGNOSIS — E78.5 DYSLIPIDEMIA: Chronic | ICD-10-CM

## 2025-01-15 DIAGNOSIS — I48.0 PAROXYSMAL ATRIAL FIBRILLATION: Primary | Chronic | ICD-10-CM

## 2025-01-15 PROCEDURE — 1159F MED LIST DOCD IN RCRD: CPT | Mod: CPTII,,, | Performed by: GENERAL PRACTICE

## 2025-01-15 PROCEDURE — 3075F SYST BP GE 130 - 139MM HG: CPT | Mod: CPTII,,, | Performed by: GENERAL PRACTICE

## 2025-01-15 PROCEDURE — 99214 OFFICE O/P EST MOD 30 MIN: CPT | Mod: 25,,, | Performed by: GENERAL PRACTICE

## 2025-01-15 PROCEDURE — 96372 THER/PROPH/DIAG INJ SC/IM: CPT | Mod: ,,, | Performed by: GENERAL PRACTICE

## 2025-01-15 PROCEDURE — 1160F RVW MEDS BY RX/DR IN RCRD: CPT | Mod: CPTII,,, | Performed by: GENERAL PRACTICE

## 2025-01-15 PROCEDURE — 3079F DIAST BP 80-89 MM HG: CPT | Mod: CPTII,,, | Performed by: GENERAL PRACTICE

## 2025-01-15 RX ORDER — BETAMETHASONE SODIUM PHOSPHATE AND BETAMETHASONE ACETATE 3; 3 MG/ML; MG/ML
9 INJECTION, SUSPENSION INTRA-ARTICULAR; INTRALESIONAL; INTRAMUSCULAR; SOFT TISSUE
Status: COMPLETED | OUTPATIENT
Start: 2025-01-15 | End: 2025-01-15

## 2025-01-15 RX ORDER — ERYTHROMYCIN 5 MG/G
OINTMENT OPHTHALMIC
Status: CANCELLED | OUTPATIENT
Start: 2025-01-15 | End: 2025-01-15

## 2025-01-15 RX ORDER — NEOMYCIN AND POLYMYXIN B SULFATES AND BACITRACIN ZINC 400; 3.5; 1 [USP'U]/G; MG/G; [USP'U]/G
OINTMENT OPHTHALMIC 4 TIMES DAILY
Qty: 3.5 G | Refills: 1 | Status: SHIPPED | OUTPATIENT
Start: 2025-01-15 | End: 2025-01-25

## 2025-01-15 RX ORDER — ROSUVASTATIN CALCIUM 5 MG/1
5 TABLET, COATED ORAL DAILY
COMMUNITY
Start: 2024-12-05

## 2025-01-15 RX ADMIN — BETAMETHASONE SODIUM PHOSPHATE AND BETAMETHASONE ACETATE 9 MG: 3; 3 INJECTION, SUSPENSION INTRA-ARTICULAR; INTRALESIONAL; INTRAMUSCULAR; SOFT TISSUE at 01:01

## 2025-01-16 ENCOUNTER — OFFICE VISIT (OUTPATIENT)
Dept: HEMATOLOGY/ONCOLOGY | Facility: CLINIC | Age: 80
End: 2025-01-16
Payer: MEDICARE

## 2025-01-16 VITALS
RESPIRATION RATE: 15 BRPM | SYSTOLIC BLOOD PRESSURE: 154 MMHG | BODY MASS INDEX: 29.09 KG/M2 | HEART RATE: 80 BPM | HEIGHT: 66 IN | OXYGEN SATURATION: 97 % | WEIGHT: 181 LBS | TEMPERATURE: 98 F | DIASTOLIC BLOOD PRESSURE: 79 MMHG

## 2025-01-16 DIAGNOSIS — Z93.2 ILEOSTOMY PRESENT: ICD-10-CM

## 2025-01-16 DIAGNOSIS — C91.10 CLL (CHRONIC LYMPHOCYTIC LEUKEMIA): Primary | Chronic | ICD-10-CM

## 2025-01-16 DIAGNOSIS — D72.0 GENETIC ANOMALIES OF LEUKOCYTES: ICD-10-CM

## 2025-01-16 DIAGNOSIS — E55.9 VITAMIN D DEFICIENCY: Chronic | ICD-10-CM

## 2025-01-16 PROCEDURE — 1160F RVW MEDS BY RX/DR IN RCRD: CPT | Mod: CPTII,S$GLB,, | Performed by: NURSE PRACTITIONER

## 2025-01-16 PROCEDURE — 1159F MED LIST DOCD IN RCRD: CPT | Mod: CPTII,S$GLB,, | Performed by: NURSE PRACTITIONER

## 2025-01-16 PROCEDURE — 3288F FALL RISK ASSESSMENT DOCD: CPT | Mod: CPTII,S$GLB,, | Performed by: NURSE PRACTITIONER

## 2025-01-16 PROCEDURE — 1101F PT FALLS ASSESS-DOCD LE1/YR: CPT | Mod: CPTII,S$GLB,, | Performed by: NURSE PRACTITIONER

## 2025-01-16 PROCEDURE — 99999 PR PBB SHADOW E&M-EST. PATIENT-LVL IV: CPT | Mod: PBBFAC,,, | Performed by: NURSE PRACTITIONER

## 2025-01-16 PROCEDURE — 99214 OFFICE O/P EST MOD 30 MIN: CPT | Mod: S$GLB,,, | Performed by: NURSE PRACTITIONER

## 2025-01-16 PROCEDURE — G2211 COMPLEX E/M VISIT ADD ON: HCPCS | Mod: S$GLB,,, | Performed by: NURSE PRACTITIONER

## 2025-01-16 PROCEDURE — 3078F DIAST BP <80 MM HG: CPT | Mod: CPTII,S$GLB,, | Performed by: NURSE PRACTITIONER

## 2025-01-16 PROCEDURE — 3077F SYST BP >= 140 MM HG: CPT | Mod: CPTII,S$GLB,, | Performed by: NURSE PRACTITIONER

## 2025-01-16 PROCEDURE — 1126F AMNT PAIN NOTED NONE PRSNT: CPT | Mod: CPTII,S$GLB,, | Performed by: NURSE PRACTITIONER

## 2025-04-23 NOTE — PROGRESS NOTES
Subjective:       Patient ID: Javier De Santiago is a 79 y.o. male.    PCP: Dr. Mick Lorenzana    Chronic Lymphocytic Leukemia--Diagnosed 5/9/24  Work-up:  Flow cytometry on peripheral blood done 5/9/24--findings c/w CLL, kappa light chain restricted.  FISH CLL panel done 6/5/24--11q deletion present in 33% of cells, associated with shorter time to first treatment in previously untreated patients.   Work-up 6/5/24--Immunoglobulins normal, Beta-2 microglobulin mildly elevated 2.73.     Current treatment: Observation    Chief Complaint: Fatigue and Pain    HPI  Patient presents for follow-up of CLL. He is doing well. His wife is with him today. Recent labs show his WBC is slightly increased 27.98. He states he has not been doing well. Complains of left sided foot and shoulder pain. He has not been evaluated by his PCP. Denies any fevers, night sweats or weight loss. Appetite good and weight increased. His biggest complaint today is fatigue but this is not new. He continues on Eliquis for atrial fib. Still smoking his pipes with no plans to quit. He has ongoing issues with his sinuses. No other problems reported today.     Past Medical History:   Diagnosis Date    Class 1 obesity due to excess calories with serious comorbidity and body mass index (BMI) of 30.0 to 30.9 in adult 1/22/2023    Fatigue     H/O colectomy     H/O ileostomy     Hyperlipidemia       Past Surgical History:   Procedure Laterality Date    A-V CARDIAC PACEMAKER INSERTION N/A 9/12/2024    Procedure: INSERTION, CARDIAC PACEMAKER, DUAL CHAMBER;  Surgeon: Guille Watts MD;  Location: [facility] CATH LAB;  Service: Cardiology;  Laterality: N/A;  DUAL CHAMBER PPM W/ LBB LEAD (MDT)    COLECTOMY      CYSTO, RETROGRADE PYELOGRAM,BALLOON DILATION, STENT PLACEMENT  01/24/2020    ILEOSTOMY      PHACOEMULSIFICATION, CATARACT, WITH IOL INSERTION Left 2/9/2024    Procedure: PHACOEMULSIFICATION, CATARACT, WITH IOL INSERTION- OS;  Surgeon: Ousmane Cho MD;  Location: [facility]  OR;  Service: Ophthalmology;  Laterality: Left;    PHACOEMULSIFICATION, CATARACT, WITH IOL INSERTION Right 3/19/2024    Procedure: PHACOEMULSIFICATION, CATARACT, WITH IOL INSERTION- OD;  Surgeon: Ousmane Cho MD;  Location: [facility] OR;  Service: Ophthalmology;  Laterality: Right;    TONSILLECTOMY       Family History   Problem Relation Name Age of Onset    Dementia Mother      Heart failure Father       Social History     Socioeconomic History    Marital status:    Tobacco Use    Smoking status: Every Day     Types: Pipe    Smokeless tobacco: Never    Tobacco comments:     A pipe a day keeps the ...   Substance and Sexual Activity    Drug use: Never     Social Drivers of Health     Financial Resource Strain: Low Risk  (1/13/2025)    Overall Financial Resource Strain (CARDIA)     Difficulty of Paying Living Expenses: Not hard at all   Food Insecurity: No Food Insecurity (1/13/2025)    Hunger Vital Sign     Worried About Running Out of Food in the Last Year: Never true     Ran Out of Food in the Last Year: Never true   Physical Activity: Inactive (1/13/2025)    Exercise Vital Sign     Days of Exercise per Week: 0 days     Minutes of Exercise per Session: 0 min   Stress: No Stress Concern Present (1/13/2025)    [employer] - Occupational Stress Questionnaire     Feeling of Stress : Only a little   Housing Stability: Unknown (1/13/2025)    Housing Stability Vital Sign     Unable to Pay for Housing in the Last Year: No       Review of patient's allergies indicates:  No Known Allergies   Current Outpatient Medications on File Prior to Visit   Medication Sig Dispense Refill    apixaban (ELIQUIS) 5 mg Tab Take 1 tablet (5 mg total) by mouth 2 (two) times daily. 60 tablet 3    fluticasone propionate (FLONASE) 50 mcg/actuation nasal spray 1 spray by Each Nostril route once daily.      potassium citrate (UROCIT-K) 10 mEq (1,080 mg) TbSR Take 10 mEq by mouth 3 (three) times daily.       "rosuvastatin (CRESTOR) 5 MG tablet Take 5 mg by mouth once daily.       No current facility-administered medications on file prior to visit.      Review of Systems   Constitutional:  Negative for appetite change and unexpected weight change.   HENT:  Negative for mouth sores.    Eyes:  Negative for visual disturbance.   Respiratory:  Negative for cough and shortness of breath.    Cardiovascular:  Negative for chest pain.   Gastrointestinal:  Negative for abdominal pain and diarrhea.   Genitourinary:  Negative for frequency.   Musculoskeletal:  Negative for back pain.        Left shoulder and left foot pain   Integumentary:  Negative for rash.   Neurological:  Negative for headaches.   Hematological:  Negative for adenopathy.   Psychiatric/Behavioral:  The patient is not nervous/anxious.         Vitals:    04/30/25 1309   BP: 129/81   Pulse: 84   Resp: 14   Temp: 98 °F (36.7 °C)   TempSrc: Oral   SpO2: 99%   Weight: 83.7 kg (184 lb 8 oz)   Height: 5' 6" (1.676 m)       Physical Exam  Constitutional:       General: He is awake.      Appearance: Normal appearance. He is overweight.   HENT:      Head: Normocephalic and atraumatic.      Nose: Nose normal.      Mouth/Throat:      Mouth: Mucous membranes are moist.   Eyes:      General: Vision grossly intact.      Extraocular Movements: Extraocular movements intact.      Conjunctiva/sclera: Conjunctivae normal.   Neck:      Comments: Several palpable cervical lymph nodes. Largest on the left, posterior cervical. Appears stable today.   Cardiovascular:      Rate and Rhythm: Normal rate and regular rhythm.      Heart sounds: Normal heart sounds.   Pulmonary:      Effort: Pulmonary effort is normal.      Breath sounds: Normal breath sounds.   Abdominal:      General: Bowel sounds are normal. There is no distension.      Palpations: Abdomen is soft.      Tenderness: There is no abdominal tenderness.   Musculoskeletal:      Cervical back: Normal range of motion and neck supple. "      Right lower leg: No edema.      Left lower leg: No edema.   Lymphadenopathy:      Cervical: Cervical adenopathy present.      Left cervical: Posterior cervical adenopathy present.      Upper Body:      Right upper body: No supraclavicular adenopathy.      Left upper body: No supraclavicular adenopathy.      Comments: Shotty palpable lymph nodes bilateral axillae.   Skin:     General: Skin is warm.   Neurological:      Mental Status: He is alert and oriented to person, place, and time.      Motor: Motor function is intact.   Psychiatric:         Mood and Affect: Mood normal.         Speech: Speech normal.         Behavior: Behavior is cooperative.         Judgment: Judgment normal.       Lab Visit on 04/25/2025   Component Date Value    Sodium 04/25/2025 142     Potassium 04/25/2025 5.0     Chloride 04/25/2025 109 (H)     CO2 04/25/2025 26     Glucose 04/25/2025 120 (H)     Blood Urea Nitrogen 04/25/2025 17.9     Creatinine 04/25/2025 1.17     Calcium 04/25/2025 9.6     Protein Total 04/25/2025 7.4     Albumin 04/25/2025 3.8     Globulin 04/25/2025 3.6 (H)     Albumin/Globulin Ratio 04/25/2025 1.1     Bilirubin Total 04/25/2025 0.8     ALP 04/25/2025 55     ALT 04/25/2025 16     AST 04/25/2025 24     eGFR 04/25/2025 >60     Anion Gap 04/25/2025 7.0     BUN/Creatinine Ratio 04/25/2025 15     Lactate Dehydrogenase 04/25/2025 455 (H)     WBC 04/25/2025 27.98 (H)     RBC 04/25/2025 4.61 (L)     Hgb 04/25/2025 14.7     Hct 04/25/2025 44.8     MCV 04/25/2025 97.2 (H)     MCH 04/25/2025 31.9 (H)     MCHC 04/25/2025 32.8 (L)     RDW 04/25/2025 13.6     Platelet 04/25/2025 170     MPV 04/25/2025 10.0     Neut % 04/25/2025 19.7     Lymph % 04/25/2025 75.2     Mono % 04/25/2025 3.5     Eos % 04/25/2025 1.3     Basophil % 04/25/2025 0.1     Imm Grans % 04/25/2025 0.2     Neut # 04/25/2025 5.53     Lymph # 04/25/2025 21.03 (H)     Mono # 04/25/2025 0.98     Eos # 04/25/2025 0.35     Baso # 04/25/2025 0.03     Imm Gran #  04/25/2025 0.06 (H)          Assessment:       1. CLL (chronic lymphocytic leukemia)    2. Localized enlarged lymph nodes    3. Elevated LDH        Plan:       Patient with CLL, diagnosed in 5/2024.  FISH with 11q deletion which can be associated with shorter time to treatment.     Obtained records from [facility], patient noted to have severe 1st degree AV block, sinus bradycardia and persistent Aflutter.   Underwent cardioversion, pacemaker placement 9/12/24.     WBC remains elevated, slightly increased at 27.98, H/H and platelets normal.  He has no B-symptoms.  CMP good.   LDH remains elevated, decreased from previous.   Patient concerned over increase in his WBC and LDH. Will go ahead and schedule baseline CT scans prior to his next appointment. He is in agreement with the plan.   Will have him follow-up in 3 months after labs and scans completed.    When patient needs treatment, recommended treatment would be BTK inhibitors due to the 11q deletion. Would need to be the lowest risk for Afib.   All questions answered at this time.      [facility]to [facility]      Visit today included increased complexity associated with the longitudinal care of the patient's chronic CLL diagnosis.    Prior to the patient's arrival on the same day, I spent (5) minutes reviewing chart. Once in the exam room with the patient, I spent (20) minutes in the room with the member performing a history and exam as well as reviewing the test results and recommendations with the patient. After leaving the exam room, I spent an additional (5) minutes completing the electronic health record. The total time spent that day making medical decisions for the patient is (30) minutes, and this time - including the breakdown - is documented in the medical record.

## 2025-04-25 ENCOUNTER — LAB VISIT (OUTPATIENT)
Dept: LAB | Facility: HOSPITAL | Age: 80
End: 2025-04-25
Attending: NURSE PRACTITIONER
Payer: MEDICARE

## 2025-04-25 DIAGNOSIS — E55.9 VITAMIN D DEFICIENCY: ICD-10-CM

## 2025-04-25 DIAGNOSIS — C91.10 CLL (CHRONIC LYMPHOCYTIC LEUKEMIA): ICD-10-CM

## 2025-04-25 DIAGNOSIS — Z93.2 ILEOSTOMY PRESENT: ICD-10-CM

## 2025-04-25 DIAGNOSIS — D72.0 GENETIC ANOMALIES OF LEUKOCYTES: ICD-10-CM

## 2025-04-25 LAB
ALBUMIN SERPL-MCNC: 3.8 G/DL (ref 3.4–4.8)
ALBUMIN/GLOB SERPL: 1.1 RATIO (ref 1.1–2)
ALP SERPL-CCNC: 55 UNIT/L (ref 40–150)
ALT SERPL-CCNC: 16 UNIT/L (ref 0–55)
ANION GAP SERPL CALC-SCNC: 7 MEQ/L
AST SERPL-CCNC: 24 UNIT/L (ref 11–45)
BASOPHILS # BLD AUTO: 0.03 X10(3)/MCL
BASOPHILS NFR BLD AUTO: 0.1 %
BILIRUB SERPL-MCNC: 0.8 MG/DL
BUN SERPL-MCNC: 17.9 MG/DL (ref 8.4–25.7)
CALCIUM SERPL-MCNC: 9.6 MG/DL (ref 8.8–10)
CHLORIDE SERPL-SCNC: 109 MMOL/L (ref 98–107)
CO2 SERPL-SCNC: 26 MMOL/L (ref 23–31)
CREAT SERPL-MCNC: 1.17 MG/DL (ref 0.72–1.25)
CREAT/UREA NIT SERPL: 15
EOSINOPHIL # BLD AUTO: 0.35 X10(3)/MCL (ref 0–0.9)
EOSINOPHIL NFR BLD AUTO: 1.3 %
ERYTHROCYTE [DISTWIDTH] IN BLOOD BY AUTOMATED COUNT: 13.6 % (ref 11.5–17)
GFR SERPLBLD CREATININE-BSD FMLA CKD-EPI: >60 ML/MIN/1.73/M2
GLOBULIN SER-MCNC: 3.6 GM/DL (ref 2.4–3.5)
GLUCOSE SERPL-MCNC: 120 MG/DL (ref 82–115)
HCT VFR BLD AUTO: 44.8 % (ref 42–52)
HGB BLD-MCNC: 14.7 G/DL (ref 14–18)
IMM GRANULOCYTES # BLD AUTO: 0.06 X10(3)/MCL (ref 0–0.04)
IMM GRANULOCYTES NFR BLD AUTO: 0.2 %
LDH SERPL-CCNC: 455 U/L (ref 125–220)
LYMPHOCYTES # BLD AUTO: 21.03 X10(3)/MCL (ref 0.6–4.6)
LYMPHOCYTES NFR BLD AUTO: 75.2 %
MCH RBC QN AUTO: 31.9 PG (ref 27–31)
MCHC RBC AUTO-ENTMCNC: 32.8 G/DL (ref 33–36)
MCV RBC AUTO: 97.2 FL (ref 80–94)
MONOCYTES # BLD AUTO: 0.98 X10(3)/MCL (ref 0.1–1.3)
MONOCYTES NFR BLD AUTO: 3.5 %
NEUTROPHILS # BLD AUTO: 5.53 X10(3)/MCL (ref 2.1–9.2)
NEUTROPHILS NFR BLD AUTO: 19.7 %
PLATELET # BLD AUTO: 170 X10(3)/MCL (ref 130–400)
PMV BLD AUTO: 10 FL (ref 7.4–10.4)
POTASSIUM SERPL-SCNC: 5 MMOL/L (ref 3.5–5.1)
PROT SERPL-MCNC: 7.4 GM/DL (ref 5.8–7.6)
RBC # BLD AUTO: 4.61 X10(6)/MCL (ref 4.7–6.1)
SODIUM SERPL-SCNC: 142 MMOL/L (ref 136–145)
WBC # BLD AUTO: 27.98 X10(3)/MCL (ref 4.5–11.5)

## 2025-04-25 PROCEDURE — 36415 COLL VENOUS BLD VENIPUNCTURE: CPT

## 2025-04-25 PROCEDURE — 83615 LACTATE (LD) (LDH) ENZYME: CPT

## 2025-04-25 PROCEDURE — 80053 COMPREHEN METABOLIC PANEL: CPT

## 2025-04-25 PROCEDURE — 85025 COMPLETE CBC W/AUTO DIFF WBC: CPT

## 2025-04-30 ENCOUNTER — OFFICE VISIT (OUTPATIENT)
Dept: HEMATOLOGY/ONCOLOGY | Facility: CLINIC | Age: 80
End: 2025-04-30
Payer: MEDICARE

## 2025-04-30 VITALS
HEART RATE: 84 BPM | WEIGHT: 184.5 LBS | RESPIRATION RATE: 14 BRPM | HEIGHT: 66 IN | OXYGEN SATURATION: 99 % | TEMPERATURE: 98 F | SYSTOLIC BLOOD PRESSURE: 129 MMHG | BODY MASS INDEX: 29.65 KG/M2 | DIASTOLIC BLOOD PRESSURE: 81 MMHG

## 2025-04-30 DIAGNOSIS — C91.10 CLL (CHRONIC LYMPHOCYTIC LEUKEMIA): Primary | Chronic | ICD-10-CM

## 2025-04-30 DIAGNOSIS — R74.02 ELEVATED LDH: ICD-10-CM

## 2025-04-30 DIAGNOSIS — R59.0 LOCALIZED ENLARGED LYMPH NODES: ICD-10-CM

## 2025-04-30 PROCEDURE — 3074F SYST BP LT 130 MM HG: CPT | Mod: CPTII,S$GLB,, | Performed by: NURSE PRACTITIONER

## 2025-04-30 PROCEDURE — G2211 COMPLEX E/M VISIT ADD ON: HCPCS | Mod: S$GLB,,, | Performed by: NURSE PRACTITIONER

## 2025-04-30 PROCEDURE — 99999 PR PBB SHADOW E&M-EST. PATIENT-LVL IV: CPT | Mod: PBBFAC,,, | Performed by: NURSE PRACTITIONER

## 2025-04-30 PROCEDURE — 3079F DIAST BP 80-89 MM HG: CPT | Mod: CPTII,S$GLB,, | Performed by: NURSE PRACTITIONER

## 2025-04-30 PROCEDURE — 1160F RVW MEDS BY RX/DR IN RCRD: CPT | Mod: CPTII,S$GLB,, | Performed by: NURSE PRACTITIONER

## 2025-04-30 PROCEDURE — 1125F AMNT PAIN NOTED PAIN PRSNT: CPT | Mod: CPTII,S$GLB,, | Performed by: NURSE PRACTITIONER

## 2025-04-30 PROCEDURE — 3288F FALL RISK ASSESSMENT DOCD: CPT | Mod: CPTII,S$GLB,, | Performed by: NURSE PRACTITIONER

## 2025-04-30 PROCEDURE — 1159F MED LIST DOCD IN RCRD: CPT | Mod: CPTII,S$GLB,, | Performed by: NURSE PRACTITIONER

## 2025-04-30 PROCEDURE — 1101F PT FALLS ASSESS-DOCD LE1/YR: CPT | Mod: CPTII,S$GLB,, | Performed by: NURSE PRACTITIONER

## 2025-04-30 PROCEDURE — 99214 OFFICE O/P EST MOD 30 MIN: CPT | Mod: S$GLB,,, | Performed by: NURSE PRACTITIONER

## 2025-07-17 ENCOUNTER — CLINICAL SUPPORT (OUTPATIENT)
Dept: PRIMARY CARE CLINIC | Facility: CLINIC | Age: 80
End: 2025-07-17
Payer: MEDICARE

## 2025-07-17 DIAGNOSIS — E78.5 DYSLIPIDEMIA: Chronic | ICD-10-CM

## 2025-07-17 DIAGNOSIS — R73.9 BORDERLINE HYPERGLYCEMIA: ICD-10-CM

## 2025-07-17 LAB
CHOLEST SERPL-MCNC: 168 MG/DL
CHOLEST/HDLC SERPL: 4 {RATIO} (ref 0–5)
EST. AVERAGE GLUCOSE BLD GHB EST-MCNC: 114 MG/DL
HBA1C MFR BLD: 5.6 %
HDLC SERPL-MCNC: 47 MG/DL (ref 35–60)
LDLC SERPL CALC-MCNC: 104 MG/DL (ref 50–140)
TRIGL SERPL-MCNC: 84 MG/DL (ref 34–140)
VLDLC SERPL CALC-MCNC: 17 MG/DL

## 2025-07-17 PROCEDURE — 83036 HEMOGLOBIN GLYCOSYLATED A1C: CPT | Performed by: GENERAL PRACTICE

## 2025-07-17 PROCEDURE — 80061 LIPID PANEL: CPT | Performed by: GENERAL PRACTICE

## 2025-07-17 NOTE — PROGRESS NOTES
Patient verified name and .Patient here for nurse visit to draw A1C and Lipid labs with (22 gauge needle. Patient was drawn in right antecubital .No complications or issues occurred. Patient tolerated venipuncture well. Patient expressed no questions or concerns.

## 2025-07-28 ENCOUNTER — HOSPITAL ENCOUNTER (OUTPATIENT)
Dept: RADIOLOGY | Facility: HOSPITAL | Age: 80
Discharge: HOME | End: 2025-07-28
Attending: NURSE PRACTITIONER
Payer: MEDICARE

## 2025-07-28 DIAGNOSIS — C91.10 CLL (CHRONIC LYMPHOCYTIC LEUKEMIA): Chronic | ICD-10-CM

## 2025-07-28 DIAGNOSIS — R59.0 LOCALIZED ENLARGED LYMPH NODES: ICD-10-CM

## 2025-07-28 DIAGNOSIS — R74.02 ELEVATED LDH: ICD-10-CM

## 2025-07-28 PROCEDURE — 74177 CT ABD & PELVIS W/CONTRAST: CPT | Mod: TC

## 2025-07-28 PROCEDURE — 70492 CT SFT TSUE NCK W/O & W/DYE: CPT | Mod: TC

## 2025-07-28 PROCEDURE — 25500020 PHARM REV CODE 255: Performed by: NURSE PRACTITIONER

## 2025-07-28 RX ORDER — DIATRIZOATE MEGLUMINE AND DIATRIZOATE SODIUM 660; 100 MG/ML; MG/ML
30 SOLUTION ORAL; RECTAL
Status: COMPLETED | OUTPATIENT
Start: 2025-07-28 | End: 2025-07-28

## 2025-07-28 RX ADMIN — IOHEXOL 75 ML: 350 INJECTION, SOLUTION INTRAVENOUS at 03:07

## 2025-07-28 RX ADMIN — DIATRIZOATE MEGLUMINE AND DIATRIZOATE SODIUM 30 ML: 660; 100 LIQUID ORAL; RECTAL at 03:07

## 2025-07-28 NOTE — PROGRESS NOTES
Subjective:       Patient ID: Javier De Satniago is a 79 y.o. male.    PCP: Dr. Mick Lorenzana    1. Chronic Lymphocytic Leukemia--Diagnosed 5/9/24    2. 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  FISH CLL panel done 6/5/24--11q deletion present in 33% of cells, associated with shorter time to first treatment in previously untreated patients.   Work-up 6/5/24--Immunoglobulins normal, Beta-2 microglobulin mildly elevated 2.73.   Imaging:  CT soft tissue neck/C/A/P done 7/28/25--Extensive lymphadenopathy throughout all cervical shelby stations bilaterally as well as subpectoral and superior mediastinal adenopathy, for reference a left cervical level 2 lymph node measures 2.9X1.8cm, right supraclavicula LN measures 1.3X1.2cm, multiple mesenteric lymph nodes identified, larges in left perinephric space encompassing 100 degrees of the aorta w/o obliteration, measures 7.8X3.9cm, multiple other lymph nodes identified, reference pelvic sidewall involving the external iliac shelby chain identified measuring 3.2cm, pulmonary scarring without evidence for pulmonary fibrosis. Prior colectomy with peristomal hernia without evidence for obstruction, enlarged prostate.    Current treatment: Brukinsa (Zanubrutinib) 160mg PO BID    Chief Complaint: Fatigue and Depression    HPI  Patient presents for follow-up of CLL. He is doing okay. Reports increasing fatigue and enlargement of lymph nodes in his neck. I went over results from imaging and plans for treatment with Zanubrutinib. Patient has a h/o Afib, currently stable and also has a pacemaker, follows with Dr. Vincent. He is also on Eliquis.     Past Medical History:   Diagnosis Date    Class 1 obesity due to excess calories with serious comorbidity and body mass index (BMI) of 30.0 to 30.9 in adult 1/22/2023    Fatigue     H/O colectomy     H/O ileostomy      Hyperlipidemia       Past Surgical History:   Procedure Laterality Date    A-V CARDIAC PACEMAKER INSERTION N/A 9/12/2024    Procedure: INSERTION, CARDIAC PACEMAKER, DUAL CHAMBER;  Surgeon: Guille Watts MD;  Location: [facility] CATH LAB;  Service: Cardiology;  Laterality: N/A;  DUAL CHAMBER PPM W/ LBB LEAD (MDT)    COLECTOMY      CYSTO, RETROGRADE PYELOGRAM,BALLOON DILATION, STENT PLACEMENT  01/24/2020    ILEOSTOMY      PHACOEMULSIFICATION, CATARACT, WITH IOL INSERTION Left 2/9/2024    Procedure: PHACOEMULSIFICATION, CATARACT, WITH IOL INSERTION- OS;  Surgeon: Ousmane Cho MD;  Location: [facility] OR;  Service: Ophthalmology;  Laterality: Left;    PHACOEMULSIFICATION, CATARACT, WITH IOL INSERTION Right 3/19/2024    Procedure: PHACOEMULSIFICATION, CATARACT, WITH IOL INSERTION- OD;  Surgeon: Ousmane Cho MD;  Location: [facility] OR;  Service: Ophthalmology;  Laterality: Right;    TONSILLECTOMY       Family History   Problem Relation Name Age of Onset    Dementia Mother      Heart failure Father       Social History     Socioeconomic History    Marital status:    Tobacco Use    Smoking status: Every Day     Types: Pipe    Smokeless tobacco: Never    Tobacco comments:     A pipe a day keeps the ...   Substance and Sexual Activity    Drug use: Never     Social Drivers of Health     Financial Resource Strain: Low Risk  (7/10/2025)    Overall Financial Resource Strain (CARDIA)     Difficulty of Paying Living Expenses: Not hard at all   Food Insecurity: No Food Insecurity (7/10/2025)    Hunger Vital Sign     Worried About Running Out of Food in the Last Year: Never true     Ran Out of Food in the Last Year: Never true   Transportation Needs: No Transportation Needs (7/10/2025)    PRAPARE - Transportation     Lack of Transportation (Medical): No     Lack of Transportation (Non-Medical): No   Physical Activity: Insufficiently Active (7/10/2025)    Exercise Vital Sign     Days of Exercise per Week: 2 days     Minutes of Exercise  "per Session: 70 min   Stress: No Stress Concern Present (7/10/2025)    [employer] - Occupational Stress Questionnaire     Feeling of Stress : Not at all   Housing Stability: Low Risk  (7/10/2025)    Housing Stability Vital Sign     Unable to Pay for Housing in the Last Year: No     Number of Times Moved in the Last Year: 0     Homeless in the Last Year: No       Review of patient's allergies indicates:  No Known Allergies   Current Outpatient Medications on File Prior to Visit   Medication Sig Dispense Refill    apixaban (ELIQUIS) 5 mg Tab Take 1 tablet (5 mg total) by mouth 2 (two) times daily. 60 tablet 3    fluticasone propionate (FLONASE) 50 mcg/actuation nasal spray 1 spray by Each Nostril route once daily.      potassium citrate (UROCIT-K) 10 mEq (1,080 mg) TbSR Take 10 mEq by mouth 3 (three) times daily.      rosuvastatin (CRESTOR) 5 MG tablet Take 5 mg by mouth once daily.       No current facility-administered medications on file prior to visit.      Review of Systems   Constitutional:  Positive for fatigue. Negative for appetite change and unexpected weight change.   HENT:  Negative for mouth sores.    Eyes:  Negative for visual disturbance.   Respiratory:  Negative for cough and shortness of breath.    Cardiovascular:  Negative for chest pain.        H/o afib/aflutter on Eliquis   Gastrointestinal:  Negative for abdominal pain and diarrhea.   Genitourinary:  Negative for frequency.   Musculoskeletal:  Negative for back pain.   Integumentary:  Negative for rash.   Neurological:  Negative for headaches.   Hematological:  Negative for adenopathy.   Psychiatric/Behavioral:  The patient is not nervous/anxious.         Vitals:    07/31/25 1059   BP: 128/82   Pulse: 86   Resp: 14   Temp: 97.7 °F (36.5 °C)   TempSrc: Oral   SpO2: 96%   Weight: 83.5 kg (184 lb)   Height: 5' 6" (1.676 m)     Wt Readings from Last 3 Encounters:   07/31/25 1059 83.5 kg (184 lb)   04/30/25 1309 83.7 kg " (184 lb 8 oz)   01/16/25 1336 82.1 kg (181 lb)       Physical Exam  Constitutional:       General: He is awake.      Appearance: Normal appearance. He is overweight.   HENT:      Head: Normocephalic and atraumatic.      Nose: Nose normal.      Mouth/Throat:      Mouth: Mucous membranes are moist.   Eyes:      General: Vision grossly intact.      Extraocular Movements: Extraocular movements intact.      Conjunctiva/sclera: Conjunctivae normal.   Neck:      Comments: Bilateral neck nodes palpable, significantly more enlarged    Cardiovascular:      Rate and Rhythm: Normal rate and regular rhythm.      Heart sounds: Normal heart sounds.   Pulmonary:      Effort: Pulmonary effort is normal.      Breath sounds: Normal breath sounds.   Chest:      Comments: Pacemaker in place left chest wall  Abdominal:      General: Bowel sounds are normal. There is no distension.      Palpations: Abdomen is soft.      Tenderness: There is no abdominal tenderness.   Musculoskeletal:      Cervical back: Normal range of motion and neck supple.      Right lower leg: No edema.      Left lower leg: No edema.   Lymphadenopathy:      Cervical: Cervical adenopathy present.      Left cervical: Posterior cervical adenopathy present.      Upper Body:      Right upper body: No supraclavicular adenopathy.      Left upper body: No supraclavicular adenopathy.      Comments: Shotty palpable lymph nodes bilateral axillae.   Skin:     General: Skin is warm.   Neurological:      Mental Status: He is alert and oriented to person, place, and time.      Motor: Motor function is intact.   Psychiatric:         Mood and Affect: Mood normal.         Speech: Speech normal.         Behavior: Behavior is cooperative.         Judgment: Judgment normal.       Lab Visit on 07/28/2025   Component Date Value    Sodium 07/28/2025 137     Potassium 07/28/2025 4.5     Chloride 07/28/2025 106     CO2 07/28/2025 24     Glucose 07/28/2025 94     Blood Urea Nitrogen 07/28/2025  22.6     Creatinine 07/28/2025 1.00     Calcium 07/28/2025 9.3     Protein Total 07/28/2025 7.1     Albumin 07/28/2025 3.6     Globulin 07/28/2025 3.5     Albumin/Globulin Ratio 07/28/2025 1.0 (L)     Bilirubin Total 07/28/2025 0.5     ALP 07/28/2025 51     ALT 07/28/2025 12     AST 07/28/2025 20     eGFR 07/28/2025 >60     Anion Gap 07/28/2025 7.0     BUN/Creatinine Ratio 07/28/2025 23     Lactate Dehydrogenase 07/28/2025 356 (H)     WBC 07/28/2025 42.90 (H)     RBC 07/28/2025 4.30 (L)     Hgb 07/28/2025 13.8 (L)     Hct 07/28/2025 42.1     MCV 07/28/2025 97.9 (H)     MCH 07/28/2025 32.1 (H)     MCHC 07/28/2025 32.8 (L)     RDW 07/28/2025 13.4     Platelet 07/28/2025 155     MPV 07/28/2025 9.9     Neut % 07/28/2025 13.1     Lymph % 07/28/2025 82.0     Mono % 07/28/2025 4.2     Eos % 07/28/2025 0.5     Basophil % 07/28/2025 0.1     Imm Grans % 07/28/2025 0.1     Neut # 07/28/2025 5.60     Lymph # 07/28/2025 35.18 (H)     Mono # 07/28/2025 1.81 (H)     Eos # 07/28/2025 0.22     Baso # 07/28/2025 0.03     Imm Gran # 07/28/2025 0.06 (H)    Clinical Support on 07/17/2025   Component Date Value    Hemoglobin A1c 07/17/2025 5.6     Estimated Average Glucose 07/17/2025 114.0     Cholesterol Total 07/17/2025 168     HDL Cholesterol 07/17/2025 47     Triglyceride 07/17/2025 84     Cholesterol/HDL Ratio 07/17/2025 4     Very Low Density Lipopro* 07/17/2025 17     LDL Cholesterol 07/17/2025 104.00          Assessment:       1. CLL (chronic lymphocytic leukemia)      Plan:       Patient with CLL, diagnosed in 5/2024.  FISH with 11q deletion which can be associated with shorter time to treatment.     Obtained records from [facility], patient noted to have severe 1st degree AV block, sinus bradycardia and persistent Aflutter.   Underwent cardioversion, pacemaker placement 9/12/24, stable since that time.    WBC more elevated today, ALC has more than doubled in the last 6 months. Now has a mild anemia, but still no B-symptoms, though he  has severe fatigue.  CT neck/C/A/P done 7/28/25 with widespread adenopathy, bulky adenopathy 8cm near aorta.  CMP good.   LDH remains elevated, decreased from previous.       Recommend to start treatment due to bulky adenopathy, increasing WBC with doubling time <6 months.     Recommend treatment with Zanubrutinib (Brukinsa) which has lowest risk for Afib. I have called and left a message to discuss treatment with cardiologist Dr. Vincent.  Discussed side effects and also patient given information from [employer] on Brukinsa for his review.   Discussed increased risk for bleeding and if this happens, would likely recommend to decrease his Eliquis down to prophylactic dose of 2.5mg BID. I may see if Dr. Vincent would agree to this now.    Prescription will be sent for insurance approval and patient to start medication when he receives it.   If his uric acid is elevated, plan for allopurinol.   F/u visit in 2 weeks labs and toxicity check.   Will need to monitor labs frequently in the beginning of treatment.     All questions answered at this time.      Sherry Watson MD      - code applied: patient requires or will require a continuous, longitudinal, and active collaborative plan of care related to this patient's health condition, CLL--the management of which requires the direction of a practitioner with specialized clinical knowledge, skill, and expertise, including the initiation, management and monitoring of high risk medication (Brukinsa).     Discussed with Dr. Vincent:  Okay with lowering the dose of Eliquis given his bleeding risk and age and starting the Brukinsa.   Patient is stable from an Afib standpoint and actually has a pacemaker, required due to bradycardia. Dr. Vincent does not think he would develop any significant tachycardia.    hSerry Watson MD

## 2025-07-31 ENCOUNTER — OFFICE VISIT (OUTPATIENT)
Dept: HEMATOLOGY/ONCOLOGY | Facility: CLINIC | Age: 80
End: 2025-07-31
Payer: MEDICARE

## 2025-07-31 VITALS
HEIGHT: 66 IN | HEART RATE: 86 BPM | TEMPERATURE: 98 F | OXYGEN SATURATION: 96 % | SYSTOLIC BLOOD PRESSURE: 128 MMHG | DIASTOLIC BLOOD PRESSURE: 82 MMHG | RESPIRATION RATE: 14 BRPM | BODY MASS INDEX: 29.57 KG/M2 | WEIGHT: 184 LBS

## 2025-07-31 DIAGNOSIS — C91.10 CLL (CHRONIC LYMPHOCYTIC LEUKEMIA): Primary | ICD-10-CM

## 2025-07-31 DIAGNOSIS — C91.10 CLL (CHRONIC LYMPHOCYTIC LEUKEMIA): Primary | Chronic | ICD-10-CM

## 2025-07-31 DIAGNOSIS — I48.20 CHRONIC ATRIAL FIBRILLATION: ICD-10-CM

## 2025-07-31 LAB
MAGNESIUM SERPL-MCNC: 1.9 MG/DL (ref 1.6–2.6)
PHOSPHATE SERPL-MCNC: 2.9 MG/DL (ref 2.3–4.7)
URATE SERPL-MCNC: 5.4 MG/DL (ref 3.5–7.2)

## 2025-07-31 PROCEDURE — G2211 COMPLEX E/M VISIT ADD ON: HCPCS | Mod: S$GLB,,, | Performed by: INTERNAL MEDICINE

## 2025-07-31 PROCEDURE — 1159F MED LIST DOCD IN RCRD: CPT | Mod: CPTII,S$GLB,, | Performed by: INTERNAL MEDICINE

## 2025-07-31 PROCEDURE — 84100 ASSAY OF PHOSPHORUS: CPT | Performed by: INTERNAL MEDICINE

## 2025-07-31 PROCEDURE — 36415 COLL VENOUS BLD VENIPUNCTURE: CPT | Performed by: INTERNAL MEDICINE

## 2025-07-31 PROCEDURE — 3288F FALL RISK ASSESSMENT DOCD: CPT | Mod: CPTII,S$GLB,, | Performed by: INTERNAL MEDICINE

## 2025-07-31 PROCEDURE — 84550 ASSAY OF BLOOD/URIC ACID: CPT | Performed by: INTERNAL MEDICINE

## 2025-07-31 PROCEDURE — 83735 ASSAY OF MAGNESIUM: CPT | Performed by: INTERNAL MEDICINE

## 2025-07-31 PROCEDURE — 3074F SYST BP LT 130 MM HG: CPT | Mod: CPTII,S$GLB,, | Performed by: INTERNAL MEDICINE

## 2025-07-31 PROCEDURE — 1126F AMNT PAIN NOTED NONE PRSNT: CPT | Mod: CPTII,S$GLB,, | Performed by: INTERNAL MEDICINE

## 2025-07-31 PROCEDURE — 1101F PT FALLS ASSESS-DOCD LE1/YR: CPT | Mod: CPTII,S$GLB,, | Performed by: INTERNAL MEDICINE

## 2025-07-31 PROCEDURE — 3079F DIAST BP 80-89 MM HG: CPT | Mod: CPTII,S$GLB,, | Performed by: INTERNAL MEDICINE

## 2025-07-31 PROCEDURE — 99215 OFFICE O/P EST HI 40 MIN: CPT | Mod: S$GLB,,, | Performed by: INTERNAL MEDICINE

## 2025-07-31 PROCEDURE — 99999 PR PBB SHADOW E&M-EST. PATIENT-LVL IV: CPT | Mod: PBBFAC,,, | Performed by: INTERNAL MEDICINE

## 2025-07-31 PROCEDURE — 1160F RVW MEDS BY RX/DR IN RCRD: CPT | Mod: CPTII,S$GLB,, | Performed by: INTERNAL MEDICINE

## 2025-08-06 NOTE — PROGRESS NOTES
Patient ID: [id number]     Chief Complaint: Annual Exam      HPI:     Javier De Santiago is a 79 y.o. male here today for a Medicare Wellness. No other complaints today.       -------------------------------------    Class 1 obesity due to excess calories with serious comorbidity and body mass index (BMI) of 30.0 to 30.9 in adult    Fatigue    H/O colectomy    H/O ileostomy    Hyperlipidemia        Past Surgical History:   Procedure Laterality Date    A-V CARDIAC PACEMAKER INSERTION N/A 9/12/2024    Procedure: INSERTION, CARDIAC PACEMAKER, DUAL CHAMBER;  Surgeon: Guille Watts MD;  Location: [facility] CATH LAB;  Service: Cardiology;  Laterality: N/A;  DUAL CHAMBER PPM W/ LBB LEAD (MDT)    COLECTOMY      CYSTO, RETROGRADE PYELOGRAM,BALLOON DILATION, STENT PLACEMENT  01/24/2020    ILEOSTOMY      PHACOEMULSIFICATION, CATARACT, WITH IOL INSERTION Left 2/9/2024    Procedure: PHACOEMULSIFICATION, CATARACT, WITH IOL INSERTION- OS;  Surgeon: Ousmane Cho MD;  Location: [facility] OR;  Service: Ophthalmology;  Laterality: Left;    PHACOEMULSIFICATION, CATARACT, WITH IOL INSERTION Right 3/19/2024    Procedure: PHACOEMULSIFICATION, CATARACT, WITH IOL INSERTION- OD;  Surgeon: Ousmane Cho MD;  Location: [facility] OR;  Service: Ophthalmology;  Laterality: Right;    TONSILLECTOMY         Review of patient's allergies indicates:  No Known Allergies    Outpatient Medications Marked as Taking for the 8/7/25 encounter (Office Visit) with Mick Lorenzana MD   Medication Sig Dispense Refill    apixaban (ELIQUIS) 2.5 mg Tab Take 1 tablet (2.5 mg total) by mouth 2 (two) times daily. 60 tablet 6    fluticasone propionate (FLONASE) 50 mcg/actuation nasal spray 1 spray by Each Nostril route once daily.      potassium citrate (UROCIT-K) 10 mEq (1,080 mg) TbSR Take 10 mEq by mouth 3 (three) times daily.      rosuvastatin (CRESTOR) 5 MG tablet Take 5 mg by mouth once daily.      zanubrutinib 80 mg Cap Take 2 capsules (160 mg total) by mouth 2 (two) times a  "day. 120 capsule 3       Social History[1]     Family History   Problem Relation Name Age of Onset    Dementia Mother      Heart failure Father          Patient Care Team:  Mick Lorenzana MD as PCP - General (Family Medicine)  Jatin Regan MD as Consulting Physician (Urology)  Ousmane Cho MD as Consulting Physician (Ophthalmology)  Benoit Vincent MD as Consulting Physician (Cardiology)  Guille Watts MD as Consulting Physician (Cardiology)  Jimena Melo PharmD as Pharmacist (Pharmacist)     Opioid Screening: Patient medication list reviewed, patient is not taking prescription opioids. Patient is at low risk of substance abuse based on this opioid use history.     Subjective:     Review of Systems   Constitutional: Negative.  Negative for malaise/fatigue and weight loss.   HENT: Negative.     Eyes: Negative.    Respiratory: Negative.  Negative for shortness of breath.    Cardiovascular: Negative.  Negative for chest pain.   Gastrointestinal: Negative.    Genitourinary: Negative.    Musculoskeletal: Negative.    Skin: Negative.    Neurological: Negative.  Negative for focal weakness, weakness and headaches.   Endo/Heme/Allergies: Negative.    Psychiatric/Behavioral: Negative.  Negative for depression and memory loss. The patient is not nervous/anxious.          Patient Reported Health Risk Assessment       Objective:     /77 (Patient Position: Sitting)   Pulse 89   Ht 5' 6" (1.676 m)   Wt 83.5 kg (184 lb)   BMI 29.70 kg/m²     Physical Exam  Constitutional:       Appearance: Normal appearance.   HENT:      Head: Normocephalic.      Mouth/Throat:      Mouth: Mucous membranes are moist.      Pharynx: Oropharynx is clear.   Eyes:      Conjunctiva/sclera: Conjunctivae normal.      Pupils: Pupils are equal, round, and reactive to light.   Cardiovascular:      Rate and Rhythm: Normal rate and regular rhythm.      Heart sounds: Normal heart sounds.   Pulmonary:      Effort: Pulmonary effort is " normal.      Breath sounds: Normal breath sounds.   Abdominal:      General: Abdomen is flat.      Palpations: Abdomen is soft.   Musculoskeletal:         General: Normal range of motion.      Cervical back: Neck supple.   Skin:     General: Skin is warm and dry.   Neurological:      General: No focal deficit present.      Mental Status: He is alert and oriented to person, place, and time.   Psychiatric:         Mood and Affect: Mood normal.         Behavior: Behavior normal.         Thought Content: Thought content normal.         Judgment: Judgment normal.         Lab Results   Component Value Date    GLU 94 07/28/2025     07/28/2025    K 4.5 07/28/2025     07/28/2025    CO2 24 07/28/2025    BUN 22.6 07/28/2025    CREATININE 1.00 07/28/2025    CALCIUM 9.3 07/28/2025    PROT 7.1 07/28/2025    ALBUMIN 3.6 07/28/2025    BILITOT 0.5 07/28/2025    ALKPHOS 51 07/28/2025    AST 20 07/28/2025    ALT 12 07/28/2025    EGFRNORACEVR >60 07/28/2025     Lab Results   Component Value Date    WBC 42.90 (H) 07/28/2025    RBC 4.30 (L) 07/28/2025    HGB 13.8 (L) 07/28/2025    HCT 42.1 07/28/2025    MCV 97.9 (H) 07/28/2025    RDW 13.4 07/28/2025     07/28/2025      Lab Results   Component Value Date    CHOL 168 07/17/2025    TRIG 84 07/17/2025    HDL 47 07/17/2025    TOTALCHOLEST 4 07/17/2025     Lab Results   Component Value Date    TSH 1.714 08/12/2024     Lab Results   Component Value Date    HGBA1C 5.6 07/17/2025        Lab Results   Component Value Date    PSA 0.92 05/01/2024              No data to display                  8/7/2025     1:40 PM 7/31/2025    10:40 AM 4/30/2025     1:00 PM 1/16/2025     1:30 PM 10/2/2024     9:40 AM 6/5/2024     1:20 PM 1/5/2024     8:30 AM   Fall Risk Assessment - Outpatient   Mobility Status Ambulatory Ambulatory Ambulatory Ambulatory Ambulatory Ambulatory Ambulatory   Number of falls 0 0 0 0 0 0 0   Identified as fall risk False False False False False False False               Assessment:       ICD-10-CM ICD-9-CM   1. Medicare annual wellness visit, subsequent  Z00.00 V70.0   2. Screening for prostate cancer  Z12.5 V76.44   3. Borderline hyperglycemia  R73.9 790.29   4. Paroxysmal atrial fibrillation  I48.0 427.31   5. Dyslipidemia  E78.5 272.4   6. 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 Vitamin D deficiency  E55.9 268.9   9. Nicotine dependence, other tobacco product, uncomplicated  F17.290 305.1   10. Overweight (BMI 25.0-29.9)  E66.3 278.02   11. CLL (chronic lymphocytic leukemia)  C91.10 204.10        Plan:     Medicare Annual Wellness and Personalized Prevention Plan:   Fall Risk + Home Safety + Hearing Impairment + Depression Screen + Cognitive Impairment Screen + Health Risk Assessment all reviewed.       Health Maintenance Topics with due status: Not Due       Topic Last Completion Date    Influenza Vaccine 11/18/2022    Lipid Panel 07/17/2025      The patient's Health Maintenance was reviewed and the following appears to be due at this time:   Health Maintenance Due   Topic Date Due    TETANUS VACCINE  Never done    Shingles Vaccine (1 of 2) Never done    Pneumococcal Vaccines (Age 50+) (1 of 2 - PCV) Never done     Health risk assessment:  After review of the patient's medical record as it relates to health risk assessment over the next 5-10 years per recommendations of the USPSTF, it was determined that all pertinent recommendations have been appropriately addressed. The patient does not desire any further preventative care measures or screening tests at this time.  We will continue to reassess at each annual visit.    1. Medicare annual wellness visit, subsequent  Comments:  Overall health status was reviewed.  Good health habits reinforced.  Annual wellness exams recommended.    2. Screening for prostate cancer  Comments:  No current PSA.    3. Borderline hyperglycemia  Overview:  Patient has had elevated blood  sugars without overt prediabetes or diabetes, with A1c levels at 5.5-5.6%.  Since patient is at risk for developing prediabetes or diabetes, we will check A1c levels annually.      4. Paroxysmal atrial fibrillation  Overview:  Sees Dr. Vincent, cardiology.  Diagnosed with AFib recently, new onset, takes Eliquis 5 mg b.i.d..  Also, has a demand pacemaker.    Patient has a history of cardiac/cardiovascular disease.  Patient does see a cardiologist on a regular basis, cardiovascular condition currently appears to be stable, with no signs of decompensation.  Patient will continue regular cardiology follow-up.        5. Dyslipidemia  Overview:  Dyslipidemia is being treated using lifestyle modification only.  Patient is not being prescribed lipid-lowering medication.  As discussed, we will continue to monitor this, and may ultimately choose to prescribe medication if this becomes difficult to control utilizing lifestyle modification only.      6. Atrial flutter, unspecified type  Overview:  Documented atrial flutter on EKG dating 08/13/2024.  Sees Dr. Vincent.  Has demand pacemaker.    Patient has a history of cardiac/cardiovascular disease.  Patient does see a cardiologist on a regular basis, cardiovascular condition currently appears to be stable, with no signs of decompensation.  Patient will continue regular cardiology follow-up.      7. Genetic anomalies of leukocytes  Overview:  Patient sees Dr. Sherry Watson, hematology, chronic lymphocytic leukemia.      8. Vitamin D deficiency  Overview:  Advised to continue (if taking) vitamin-D supplementation at current level.  Levels will/may be checked annually.  If vitamin-D level is low, instructed to either start vitamin-D supplementation as directed or increase current level/dosage of supplementation.      9. Nicotine dependence, other tobacco product, uncomplicated  Overview:  Cessation of tobacco use in any form was discussed and strongly encouraged.  Assistance offered,  information will be provided.  If not ready to quit now, patient will contact this clinic in the future if I can be of any assistance related to the discontinuation of tobacco use/smoking.      10. Overweight (BMI 25.0-29.9)  Overview:  Weight loss, healthy dietary choices, increased activity and exercise are all recommended.      11. CLL (chronic lymphocytic leukemia)  Overview:  From Heme-Onc note, June 2024:    Plan  Patient with CLL, newly diagnosed.  WBC mildly elevated, normal H/H and normal platelets.  Patient has fatigue, but doubt from disease as he has no anemia or thrombocytopenia or other B-symptoms.      No clear indications for any treatment at this time.  Will check LDH, beta-2 microglobulin and FISH CLL panel.     Plan for observation only for now.    Assessment & Plan:  08/07/2025:  Patient has had a worsening of his CLL, most recent WBC count over 42,000, soon to start zanubrutinib              Advance Care Planning     Date: 08/05/2025  During this visit, I engaged the patient  in the voluntary advance care planning process.  The patient and I reviewed the role for advance directives and their purpose in directing future healthcare if the patient's unable to speak for him/herself.  At this point in time, the patient does have full decision-making capacity.  We discussed different extreme health states that she could experience, and reviewed what kind of medical care she would want in those situations.  The patient communicated that if she were comatose and had little chance of a meaningful recovery, she would not want machines/life-sustaining treatments used. In addition to the above preference, other important end-of-life issues for the patient include no other issues.  Advanced care planning paperwork offered to the patient to bring home and discuss with the family.  If signed, patient should bring form back for for the purposes of entering it into the medical record.  I spent a total of 5  minutes engaging the patient in this advance care planning discussion.        Current Outpatient Medications   Medication Instructions    apixaban (ELIQUIS) 2.5 mg, Oral, 2 times daily    BRUKINSA 160 mg, Oral, 2 times daily    fluticasone propionate (FLONASE) 50 mcg/actuation nasal spray 1 spray, Daily    potassium citrate (UROCIT-K) 10 mEq (1,080 mg) TbSR 10 mEq, 3 times daily    rosuvastatin (CRESTOR) 5 mg, Daily        Follow up in about 6 months (around 2/10/2026) for Chronic condition F/up. In addition to their scheduled follow up, the patient has also been instructed to follow up on as needed basis.     This note was created with the assistance of [employer] voice recognition software or phone dictation. There may be transcription errors as a result of using this technology. Effort has been made to assure accuracy of transcription but any obvious errors or omissions should be clarified with the author of the document.         [1]   Social History  Socioeconomic History    Marital status:    Tobacco Use    Smoking status: Every Day     Types: Pipe    Smokeless tobacco: Never    Tobacco comments:     A pipe a day keeps the ...   Substance and Sexual Activity    Drug use: Never     Social Drivers of Health     Financial Resource Strain: Low Risk  (7/10/2025)    Overall Financial Resource Strain (CARDIA)     Difficulty of Paying Living Expenses: Not hard at all   Food Insecurity: No Food Insecurity (7/10/2025)    Hunger Vital Sign     Worried About Running Out of Food in the Last Year: Never true     Ran Out of Food in the Last Year: Never true   Transportation Needs: No Transportation Needs (7/10/2025)    PRAPARE - Transportation     Lack of Transportation (Medical): No     Lack of Transportation (Non-Medical): No   Physical Activity: Insufficiently Active (7/10/2025)    Exercise Vital Sign     Days of Exercise per Week: 2 days     Minutes of Exercise per Session: 70 min   Stress: No Stress Concern Present  (7/10/2025)    [employer] - Occupational Stress Questionnaire     Feeling of Stress : Not at all   Housing Stability: Low Risk  (7/10/2025)    Housing Stability Vital Sign     Unable to Pay for Housing in the Last Year: No     Number of Times Moved in the Last Year: 0     Homeless in the Last Year: No

## 2025-08-07 ENCOUNTER — OFFICE VISIT (OUTPATIENT)
Dept: PRIMARY CARE CLINIC | Facility: CLINIC | Age: 80
End: 2025-08-07
Payer: MEDICARE

## 2025-08-07 VITALS
BODY MASS INDEX: 29.57 KG/M2 | HEART RATE: 89 BPM | DIASTOLIC BLOOD PRESSURE: 77 MMHG | SYSTOLIC BLOOD PRESSURE: 125 MMHG | HEIGHT: 66 IN | WEIGHT: 184 LBS

## 2025-08-07 DIAGNOSIS — Z12.5 SCREENING FOR PROSTATE CANCER: ICD-10-CM

## 2025-08-07 DIAGNOSIS — C91.10 CLL (CHRONIC LYMPHOCYTIC LEUKEMIA): Chronic | ICD-10-CM

## 2025-08-07 DIAGNOSIS — Z00.00 MEDICARE ANNUAL WELLNESS VISIT, SUBSEQUENT: Primary | ICD-10-CM

## 2025-08-07 DIAGNOSIS — F17.290 NICOTINE DEPENDENCE, OTHER TOBACCO PRODUCT, UNCOMPLICATED: Chronic | ICD-10-CM

## 2025-08-07 DIAGNOSIS — E55.9 VITAMIN D DEFICIENCY: Chronic | ICD-10-CM

## 2025-08-07 DIAGNOSIS — E66.3 OVERWEIGHT (BMI 25.0-29.9): Chronic | ICD-10-CM

## 2025-08-07 DIAGNOSIS — E78.5 DYSLIPIDEMIA: Chronic | ICD-10-CM

## 2025-08-07 DIAGNOSIS — I48.92 ATRIAL FLUTTER, UNSPECIFIED TYPE: Chronic | ICD-10-CM

## 2025-08-07 DIAGNOSIS — R73.9 BORDERLINE HYPERGLYCEMIA: ICD-10-CM

## 2025-08-07 DIAGNOSIS — I48.0 PAROXYSMAL ATRIAL FIBRILLATION: Chronic | ICD-10-CM

## 2025-08-07 DIAGNOSIS — D72.0 GENETIC ANOMALIES OF LEUKOCYTES: ICD-10-CM

## 2025-08-07 NOTE — ASSESSMENT & PLAN NOTE
08/07/2025:  Patient has had a worsening of his CLL, most recent WBC count over 42,000, soon to start zanubrutinib

## 2025-08-14 ENCOUNTER — OFFICE VISIT (OUTPATIENT)
Dept: HEMATOLOGY/ONCOLOGY | Facility: CLINIC | Age: 80
End: 2025-08-14
Payer: MEDICARE

## 2025-08-14 ENCOUNTER — LAB VISIT (OUTPATIENT)
Dept: LAB | Facility: HOSPITAL | Age: 80
End: 2025-08-14
Attending: INTERNAL MEDICINE
Payer: MEDICARE

## 2025-08-14 VITALS
DIASTOLIC BLOOD PRESSURE: 84 MMHG | SYSTOLIC BLOOD PRESSURE: 127 MMHG | TEMPERATURE: 98 F | OXYGEN SATURATION: 95 % | HEART RATE: 86 BPM | WEIGHT: 183.69 LBS | HEIGHT: 66 IN | RESPIRATION RATE: 14 BRPM | BODY MASS INDEX: 29.52 KG/M2

## 2025-08-14 DIAGNOSIS — C91.10 CLL (CHRONIC LYMPHOCYTIC LEUKEMIA): Primary | Chronic | ICD-10-CM

## 2025-08-14 DIAGNOSIS — C91.10 CLL (CHRONIC LYMPHOCYTIC LEUKEMIA): Primary | ICD-10-CM

## 2025-08-14 DIAGNOSIS — C91.10 CLL (CHRONIC LYMPHOCYTIC LEUKEMIA): ICD-10-CM

## 2025-08-14 LAB
ALBUMIN SERPL-MCNC: 3.8 G/DL (ref 3.4–4.8)
ALBUMIN/GLOB SERPL: 1 RATIO (ref 1.1–2)
ALP SERPL-CCNC: 56 UNIT/L (ref 40–150)
ALT SERPL-CCNC: 10 UNIT/L (ref 0–55)
ANION GAP SERPL CALC-SCNC: 9 MEQ/L
AST SERPL-CCNC: 25 UNIT/L (ref 11–45)
BASOPHILS # BLD AUTO: 0.05 X10(3)/MCL
BASOPHILS NFR BLD AUTO: 0.1 %
BILIRUB SERPL-MCNC: 1.1 MG/DL
BUN SERPL-MCNC: 19.1 MG/DL (ref 8.4–25.7)
CALCIUM SERPL-MCNC: 9.7 MG/DL (ref 8.8–10)
CHLORIDE SERPL-SCNC: 107 MMOL/L (ref 98–107)
CO2 SERPL-SCNC: 23 MMOL/L (ref 23–31)
CREAT SERPL-MCNC: 1.16 MG/DL (ref 0.72–1.25)
CREAT/UREA NIT SERPL: 16
EOSINOPHIL # BLD AUTO: 0.17 X10(3)/MCL (ref 0–0.9)
EOSINOPHIL NFR BLD AUTO: 0.2 %
ERYTHROCYTE [DISTWIDTH] IN BLOOD BY AUTOMATED COUNT: 13.4 % (ref 11.5–17)
GFR SERPLBLD CREATININE-BSD FMLA CKD-EPI: >60 ML/MIN/1.73/M2
GLOBULIN SER-MCNC: 3.7 GM/DL (ref 2.4–3.5)
GLUCOSE SERPL-MCNC: 85 MG/DL (ref 82–115)
HCT VFR BLD AUTO: 44.9 % (ref 42–52)
HGB BLD-MCNC: 14.4 G/DL (ref 14–18)
IMM GRANULOCYTES # BLD AUTO: 0.09 X10(3)/MCL (ref 0–0.04)
IMM GRANULOCYTES NFR BLD AUTO: 0.1 %
LDH SERPL-CCNC: 565 U/L (ref 125–220)
LYMPHOCYTES # BLD AUTO: 71.76 X10(3)/MCL (ref 0.6–4.6)
LYMPHOCYTES NFR BLD AUTO: 91 %
MAGNESIUM SERPL-MCNC: 2 MG/DL (ref 1.6–2.6)
MCH RBC QN AUTO: 32.1 PG (ref 27–31)
MCHC RBC AUTO-ENTMCNC: 32.1 G/DL (ref 33–36)
MCV RBC AUTO: 100 FL (ref 80–94)
MONOCYTES # BLD AUTO: 1.32 X10(3)/MCL (ref 0.1–1.3)
MONOCYTES NFR BLD AUTO: 1.7 %
NEUTROPHILS # BLD AUTO: 5.5 X10(3)/MCL (ref 2.1–9.2)
NEUTROPHILS NFR BLD AUTO: 6.9 %
PHOSPHATE SERPL-MCNC: 3.9 MG/DL (ref 2.3–4.7)
PLATELET # BLD AUTO: 130 X10(3)/MCL (ref 130–400)
PMV BLD AUTO: 10.6 FL (ref 7.4–10.4)
POTASSIUM SERPL-SCNC: 5.9 MMOL/L (ref 3.5–5.1)
PROT SERPL-MCNC: 7.5 GM/DL (ref 5.8–7.6)
RBC # BLD AUTO: 4.49 X10(6)/MCL (ref 4.7–6.1)
SODIUM SERPL-SCNC: 139 MMOL/L (ref 136–145)
URATE SERPL-MCNC: 4.9 MG/DL (ref 3.5–7.2)
WBC # BLD AUTO: 78.89 X10(3)/MCL (ref 4.5–11.5)

## 2025-08-14 PROCEDURE — G2211 COMPLEX E/M VISIT ADD ON: HCPCS | Mod: S$GLB,,, | Performed by: INTERNAL MEDICINE

## 2025-08-14 PROCEDURE — 3288F FALL RISK ASSESSMENT DOCD: CPT | Mod: CPTII,S$GLB,, | Performed by: INTERNAL MEDICINE

## 2025-08-14 PROCEDURE — 83615 LACTATE (LD) (LDH) ENZYME: CPT

## 2025-08-14 PROCEDURE — 1126F AMNT PAIN NOTED NONE PRSNT: CPT | Mod: CPTII,S$GLB,, | Performed by: INTERNAL MEDICINE

## 2025-08-14 PROCEDURE — 85025 COMPLETE CBC W/AUTO DIFF WBC: CPT

## 2025-08-14 PROCEDURE — 1159F MED LIST DOCD IN RCRD: CPT | Mod: CPTII,S$GLB,, | Performed by: INTERNAL MEDICINE

## 2025-08-14 PROCEDURE — 3074F SYST BP LT 130 MM HG: CPT | Mod: CPTII,S$GLB,, | Performed by: INTERNAL MEDICINE

## 2025-08-14 PROCEDURE — 3079F DIAST BP 80-89 MM HG: CPT | Mod: CPTII,S$GLB,, | Performed by: INTERNAL MEDICINE

## 2025-08-14 PROCEDURE — 80053 COMPREHEN METABOLIC PANEL: CPT

## 2025-08-14 PROCEDURE — 84550 ASSAY OF BLOOD/URIC ACID: CPT

## 2025-08-14 PROCEDURE — 99999 PR PBB SHADOW E&M-EST. PATIENT-LVL IV: CPT | Mod: PBBFAC,,, | Performed by: INTERNAL MEDICINE

## 2025-08-14 PROCEDURE — 83735 ASSAY OF MAGNESIUM: CPT

## 2025-08-14 PROCEDURE — 99215 OFFICE O/P EST HI 40 MIN: CPT | Mod: S$GLB,,, | Performed by: INTERNAL MEDICINE

## 2025-08-14 PROCEDURE — 84100 ASSAY OF PHOSPHORUS: CPT

## 2025-08-14 PROCEDURE — 1101F PT FALLS ASSESS-DOCD LE1/YR: CPT | Mod: CPTII,S$GLB,, | Performed by: INTERNAL MEDICINE

## 2025-08-14 PROCEDURE — 36415 COLL VENOUS BLD VENIPUNCTURE: CPT

## 2025-08-15 ENCOUNTER — LAB VISIT (OUTPATIENT)
Dept: LAB | Facility: HOSPITAL | Age: 80
End: 2025-08-15
Attending: INTERNAL MEDICINE
Payer: MEDICARE

## 2025-08-15 DIAGNOSIS — C91.10 CLL (CHRONIC LYMPHOCYTIC LEUKEMIA): ICD-10-CM

## 2025-08-15 LAB
ALBUMIN SERPL-MCNC: 3.7 G/DL (ref 3.4–4.8)
ALBUMIN/GLOB SERPL: 1 RATIO (ref 1.1–2)
ALP SERPL-CCNC: 60 UNIT/L (ref 40–150)
ALT SERPL-CCNC: 10 UNIT/L (ref 0–55)
ANION GAP SERPL CALC-SCNC: 9 MEQ/L
AST SERPL-CCNC: 19 UNIT/L (ref 11–45)
BILIRUB SERPL-MCNC: 1 MG/DL
BUN SERPL-MCNC: 19.7 MG/DL (ref 8.4–25.7)
CALCIUM SERPL-MCNC: 9.2 MG/DL (ref 8.8–10)
CHLORIDE SERPL-SCNC: 106 MMOL/L (ref 98–107)
CO2 SERPL-SCNC: 24 MMOL/L (ref 23–31)
CREAT SERPL-MCNC: 1.05 MG/DL (ref 0.72–1.25)
CREAT/UREA NIT SERPL: 19
GFR SERPLBLD CREATININE-BSD FMLA CKD-EPI: >60 ML/MIN/1.73/M2
GLOBULIN SER-MCNC: 3.6 GM/DL (ref 2.4–3.5)
GLUCOSE SERPL-MCNC: 102 MG/DL (ref 82–115)
POTASSIUM SERPL-SCNC: 4.3 MMOL/L (ref 3.5–5.1)
PROT SERPL-MCNC: 7.3 GM/DL (ref 5.8–7.6)
SODIUM SERPL-SCNC: 139 MMOL/L (ref 136–145)

## 2025-08-15 PROCEDURE — 36415 COLL VENOUS BLD VENIPUNCTURE: CPT

## 2025-08-15 PROCEDURE — 80053 COMPREHEN METABOLIC PANEL: CPT

## 2025-08-21 ENCOUNTER — LAB VISIT (OUTPATIENT)
Dept: LAB | Facility: HOSPITAL | Age: 80
End: 2025-08-21
Attending: INTERNAL MEDICINE
Payer: MEDICARE

## 2025-08-21 DIAGNOSIS — C91.10 CLL (CHRONIC LYMPHOCYTIC LEUKEMIA): ICD-10-CM

## 2025-08-21 LAB
ALBUMIN SERPL-MCNC: 3.6 G/DL (ref 3.4–4.8)
ALBUMIN/GLOB SERPL: 1 RATIO (ref 1.1–2)
ALP SERPL-CCNC: 65 UNIT/L (ref 40–150)
ALT SERPL-CCNC: 10 UNIT/L (ref 0–55)
ANION GAP SERPL CALC-SCNC: 6 MEQ/L
AST SERPL-CCNC: 17 UNIT/L (ref 11–45)
BASOPHILS # BLD AUTO: 0.05 X10(3)/MCL
BASOPHILS NFR BLD AUTO: 0.1 %
BILIRUB SERPL-MCNC: 0.6 MG/DL
BUN SERPL-MCNC: 16.1 MG/DL (ref 8.4–25.7)
CALCIUM SERPL-MCNC: 9.3 MG/DL (ref 8.8–10)
CHLORIDE SERPL-SCNC: 108 MMOL/L (ref 98–107)
CO2 SERPL-SCNC: 25 MMOL/L (ref 23–31)
CREAT SERPL-MCNC: 1.02 MG/DL (ref 0.72–1.25)
CREAT/UREA NIT SERPL: 16
EOSINOPHIL # BLD AUTO: 0.13 X10(3)/MCL (ref 0–0.9)
EOSINOPHIL NFR BLD AUTO: 0.2 %
ERYTHROCYTE [DISTWIDTH] IN BLOOD BY AUTOMATED COUNT: 13.5 % (ref 11.5–17)
GFR SERPLBLD CREATININE-BSD FMLA CKD-EPI: >60 ML/MIN/1.73/M2
GLOBULIN SER-MCNC: 3.7 GM/DL (ref 2.4–3.5)
GLUCOSE SERPL-MCNC: 121 MG/DL (ref 82–115)
HCT VFR BLD AUTO: 42 % (ref 42–52)
HGB BLD-MCNC: 13.6 G/DL (ref 14–18)
IMM GRANULOCYTES # BLD AUTO: 0.07 X10(3)/MCL (ref 0–0.04)
IMM GRANULOCYTES NFR BLD AUTO: 0.1 %
LYMPHOCYTES # BLD AUTO: 77.27 X10(3)/MCL (ref 0.6–4.6)
LYMPHOCYTES NFR BLD AUTO: 93.1 %
MCH RBC QN AUTO: 31.9 PG (ref 27–31)
MCHC RBC AUTO-ENTMCNC: 32.4 G/DL (ref 33–36)
MCV RBC AUTO: 98.4 FL (ref 80–94)
MONOCYTES # BLD AUTO: 0.46 X10(3)/MCL (ref 0.1–1.3)
MONOCYTES NFR BLD AUTO: 0.6 %
NEUTROPHILS # BLD AUTO: 5 X10(3)/MCL (ref 2.1–9.2)
NEUTROPHILS NFR BLD AUTO: 5.9 %
PLATELET # BLD AUTO: 168 X10(3)/MCL (ref 130–400)
PMV BLD AUTO: 10.2 FL (ref 7.4–10.4)
POTASSIUM SERPL-SCNC: 4.4 MMOL/L (ref 3.5–5.1)
PROT SERPL-MCNC: 7.3 GM/DL (ref 5.8–7.6)
RBC # BLD AUTO: 4.27 X10(6)/MCL (ref 4.7–6.1)
SODIUM SERPL-SCNC: 139 MMOL/L (ref 136–145)
WBC # BLD AUTO: 82.98 X10(3)/MCL (ref 4.5–11.5)

## 2025-08-21 PROCEDURE — 85025 COMPLETE CBC W/AUTO DIFF WBC: CPT

## 2025-08-21 PROCEDURE — 80053 COMPREHEN METABOLIC PANEL: CPT

## 2025-08-21 PROCEDURE — 36415 COLL VENOUS BLD VENIPUNCTURE: CPT

## 2025-08-28 ENCOUNTER — LAB VISIT (OUTPATIENT)
Dept: LAB | Facility: HOSPITAL | Age: 80
End: 2025-08-28
Attending: INTERNAL MEDICINE
Payer: MEDICARE

## 2025-08-28 DIAGNOSIS — C91.10 CLL (CHRONIC LYMPHOCYTIC LEUKEMIA): ICD-10-CM

## 2025-08-28 LAB
ALBUMIN SERPL-MCNC: 3.9 G/DL (ref 3.4–4.8)
ALBUMIN/GLOB SERPL: 1 RATIO (ref 1.1–2)
ALP SERPL-CCNC: 62 UNIT/L (ref 40–150)
ALT SERPL-CCNC: 10 UNIT/L (ref 0–55)
ANION GAP SERPL CALC-SCNC: 8 MEQ/L
AST SERPL-CCNC: 18 UNIT/L (ref 11–45)
BASOPHILS # BLD AUTO: 0.04 X10(3)/MCL
BASOPHILS NFR BLD AUTO: 0 %
BILIRUB SERPL-MCNC: 0.5 MG/DL
BUN SERPL-MCNC: 14.8 MG/DL (ref 8.4–25.7)
CALCIUM SERPL-MCNC: 9.6 MG/DL (ref 8.8–10)
CHLORIDE SERPL-SCNC: 109 MMOL/L (ref 98–107)
CO2 SERPL-SCNC: 25 MMOL/L (ref 23–31)
CREAT SERPL-MCNC: 1.06 MG/DL (ref 0.72–1.25)
CREAT/UREA NIT SERPL: 14
EOSINOPHIL # BLD AUTO: 0.14 X10(3)/MCL (ref 0–0.9)
EOSINOPHIL NFR BLD AUTO: 0.1 %
ERYTHROCYTE [DISTWIDTH] IN BLOOD BY AUTOMATED COUNT: 13.6 % (ref 11.5–17)
GFR SERPLBLD CREATININE-BSD FMLA CKD-EPI: >60 ML/MIN/1.73/M2
GLOBULIN SER-MCNC: 4 GM/DL (ref 2.4–3.5)
GLUCOSE SERPL-MCNC: 93 MG/DL (ref 82–115)
HCT VFR BLD AUTO: 44.7 % (ref 42–52)
HGB BLD-MCNC: 14.1 G/DL (ref 14–18)
IMM GRANULOCYTES # BLD AUTO: 0.07 X10(3)/MCL (ref 0–0.04)
IMM GRANULOCYTES NFR BLD AUTO: 0.1 %
LYMPHOCYTES # BLD AUTO: 94.59 X10(3)/MCL (ref 0.6–4.6)
LYMPHOCYTES NFR BLD AUTO: 94.7 %
MCH RBC QN AUTO: 31.3 PG (ref 27–31)
MCHC RBC AUTO-ENTMCNC: 31.5 G/DL (ref 33–36)
MCV RBC AUTO: 99.3 FL (ref 80–94)
MONOCYTES # BLD AUTO: 0.77 X10(3)/MCL (ref 0.1–1.3)
MONOCYTES NFR BLD AUTO: 0.8 %
NEUTROPHILS # BLD AUTO: 4.32 X10(3)/MCL (ref 2.1–9.2)
NEUTROPHILS NFR BLD AUTO: 4.3 %
PLATELET # BLD AUTO: 188 X10(3)/MCL (ref 130–400)
PMV BLD AUTO: 10.3 FL (ref 7.4–10.4)
POTASSIUM SERPL-SCNC: 4.6 MMOL/L (ref 3.5–5.1)
PROT SERPL-MCNC: 7.9 GM/DL (ref 5.8–7.6)
RBC # BLD AUTO: 4.5 X10(6)/MCL (ref 4.7–6.1)
SODIUM SERPL-SCNC: 142 MMOL/L (ref 136–145)
WBC # BLD AUTO: 99.93 X10(3)/MCL (ref 4.5–11.5)

## 2025-08-28 PROCEDURE — 85025 COMPLETE CBC W/AUTO DIFF WBC: CPT

## 2025-08-28 PROCEDURE — 36415 COLL VENOUS BLD VENIPUNCTURE: CPT

## 2025-08-28 PROCEDURE — 80053 COMPREHEN METABOLIC PANEL: CPT

## (undated) DEVICE — NDL HYPO REG 25G X 1 1/2

## (undated) DEVICE — PAD DEFIB RADIOTRANSPARENT

## (undated) DEVICE — PACK PACER PERMANENT

## (undated) DEVICE — CONTRAST ISOVUE 370 500ML MULT

## (undated) DEVICE — ELECTRODE REM PLYHSV RETURN 9

## (undated) DEVICE — RAD PAD

## (undated) DEVICE — CATH ANGIO RIGHTSITE HIS 7X43

## (undated) DEVICE — SUT VICRYL 2-0 36 CT-1

## (undated) DEVICE — SUT SILK 0 SH 30IN BLK BR

## (undated) DEVICE — ADHESIVE DERMABOND ADVANCED

## (undated) DEVICE — SUT CTD VICRYL PLUS 4/0

## (undated) DEVICE — CANNULA ADULT NASAL 7FT

## (undated) DEVICE — GUIDE WIRE WHOLLY FLOPPY